# Patient Record
Sex: FEMALE | Race: BLACK OR AFRICAN AMERICAN | NOT HISPANIC OR LATINO | Employment: FULL TIME | URBAN - NONMETROPOLITAN AREA
[De-identification: names, ages, dates, MRNs, and addresses within clinical notes are randomized per-mention and may not be internally consistent; named-entity substitution may affect disease eponyms.]

---

## 2023-05-04 ENCOUNTER — OFFICE VISIT (OUTPATIENT)
Dept: FAMILY MEDICINE | Facility: CLINIC | Age: 33
End: 2023-05-04
Payer: COMMERCIAL

## 2023-05-04 VITALS
RESPIRATION RATE: 18 BRPM | WEIGHT: 293 LBS | OXYGEN SATURATION: 99 % | HEART RATE: 75 BPM | HEIGHT: 66 IN | BODY MASS INDEX: 47.09 KG/M2 | DIASTOLIC BLOOD PRESSURE: 104 MMHG | SYSTOLIC BLOOD PRESSURE: 169 MMHG | TEMPERATURE: 100 F

## 2023-05-04 DIAGNOSIS — I10 ESSENTIAL HYPERTENSION: Primary | ICD-10-CM

## 2023-05-04 DIAGNOSIS — R73.9 HYPERGLYCEMIA: ICD-10-CM

## 2023-05-04 DIAGNOSIS — R51.9 NONINTRACTABLE HEADACHE, UNSPECIFIED CHRONICITY PATTERN, UNSPECIFIED HEADACHE TYPE: ICD-10-CM

## 2023-05-04 DIAGNOSIS — Z86.2 HISTORY OF ITP: ICD-10-CM

## 2023-05-04 DIAGNOSIS — E66.01 CLASS 3 SEVERE OBESITY WITH SERIOUS COMORBIDITY AND BODY MASS INDEX (BMI) OF 50.0 TO 59.9 IN ADULT, UNSPECIFIED OBESITY TYPE: ICD-10-CM

## 2023-05-04 DIAGNOSIS — Z90.81 H/O SPLENECTOMY: ICD-10-CM

## 2023-05-04 PROBLEM — M32.9 LUPUS: Status: RESOLVED | Noted: 2023-05-04 | Resolved: 2023-05-04

## 2023-05-04 PROBLEM — M32.9 LUPUS: Status: ACTIVE | Noted: 2023-05-04

## 2023-05-04 LAB
ALBUMIN SERPL BCP-MCNC: 3.1 G/DL (ref 3.5–5)
ALBUMIN/GLOB SERPL: 0.6 {RATIO}
ALP SERPL-CCNC: 52 U/L (ref 37–98)
ALT SERPL W P-5'-P-CCNC: 17 U/L (ref 13–56)
ANION GAP SERPL CALCULATED.3IONS-SCNC: 4 MMOL/L (ref 7–16)
AST SERPL W P-5'-P-CCNC: 12 U/L (ref 15–37)
BASOPHILS # BLD AUTO: 0.02 K/UL (ref 0–0.2)
BASOPHILS NFR BLD AUTO: 0.2 % (ref 0–1)
BILIRUB SERPL-MCNC: 0.2 MG/DL (ref ?–1.2)
BUN SERPL-MCNC: 8 MG/DL (ref 7–18)
BUN/CREAT SERPL: 13 (ref 6–20)
CALCIUM SERPL-MCNC: 8.5 MG/DL (ref 8.5–10.1)
CHLORIDE SERPL-SCNC: 106 MMOL/L (ref 98–107)
CHOLEST SERPL-MCNC: 172 MG/DL (ref 0–200)
CHOLEST/HDLC SERPL: 4.1 {RATIO}
CO2 SERPL-SCNC: 28 MMOL/L (ref 21–32)
CREAT SERPL-MCNC: 0.61 MG/DL (ref 0.55–1.02)
DIFFERENTIAL METHOD BLD: ABNORMAL
EGFR (NO RACE VARIABLE) (RUSH/TITUS): 123 ML/MIN/1.73M2
EOSINOPHIL # BLD AUTO: 0.07 K/UL (ref 0–0.5)
EOSINOPHIL NFR BLD AUTO: 0.8 % (ref 1–4)
ERYTHROCYTE [DISTWIDTH] IN BLOOD BY AUTOMATED COUNT: 13.5 % (ref 11.5–14.5)
GLOBULIN SER-MCNC: 5 G/DL (ref 2–4)
GLUCOSE SERPL-MCNC: 136 MG/DL (ref 74–106)
HCT VFR BLD AUTO: 39.5 % (ref 38–47)
HDLC SERPL-MCNC: 42 MG/DL (ref 40–60)
HGB BLD-MCNC: 13 G/DL (ref 12–16)
IMM GRANULOCYTES # BLD AUTO: 0.02 K/UL (ref 0–0.04)
IMM GRANULOCYTES NFR BLD: 0.2 % (ref 0–0.4)
LDLC SERPL CALC-MCNC: 117 MG/DL
LDLC/HDLC SERPL: 2.8 {RATIO}
LYMPHOCYTES # BLD AUTO: 3.98 K/UL (ref 1–4.8)
LYMPHOCYTES NFR BLD AUTO: 44.4 % (ref 27–41)
MCH RBC QN AUTO: 29.9 PG (ref 27–31)
MCHC RBC AUTO-ENTMCNC: 32.9 G/DL (ref 32–36)
MCV RBC AUTO: 90.8 FL (ref 80–96)
MONOCYTES # BLD AUTO: 0.7 K/UL (ref 0–0.8)
MONOCYTES NFR BLD AUTO: 7.8 % (ref 2–6)
MPC BLD CALC-MCNC: 10.3 FL (ref 9.4–12.4)
NEUTROPHILS # BLD AUTO: 4.17 K/UL (ref 1.8–7.7)
NEUTROPHILS NFR BLD AUTO: 46.6 % (ref 53–65)
NONHDLC SERPL-MCNC: 130 MG/DL
NRBC # BLD AUTO: 0 X10E3/UL
NRBC, AUTO (.00): 0 %
PLATELET # BLD AUTO: 369 K/UL (ref 150–400)
POTASSIUM SERPL-SCNC: 4 MMOL/L (ref 3.5–5.1)
PROT SERPL-MCNC: 8.1 G/DL (ref 6.4–8.2)
RBC # BLD AUTO: 4.35 M/UL (ref 4.2–5.4)
SODIUM SERPL-SCNC: 134 MMOL/L (ref 136–145)
TRIGL SERPL-MCNC: 67 MG/DL (ref 35–150)
VLDLC SERPL-MCNC: 13 MG/DL
WBC # BLD AUTO: 8.96 K/UL (ref 4.5–11)

## 2023-05-04 PROCEDURE — 83036 HEMOGLOBIN GLYCOSYLATED A1C: CPT | Mod: ,,, | Performed by: CLINICAL MEDICAL LABORATORY

## 2023-05-04 PROCEDURE — 96372 PR INJECTION,THERAP/PROPH/DIAG2ST, IM OR SUBCUT: ICD-10-PCS | Mod: ICN,,, | Performed by: FAMILY MEDICINE

## 2023-05-04 PROCEDURE — 1159F PR MEDICATION LIST DOCUMENTED IN MEDICAL RECORD: ICD-10-PCS | Mod: ICN,,, | Performed by: FAMILY MEDICINE

## 2023-05-04 PROCEDURE — 80053 COMPREHENSIVE METABOLIC PANEL: ICD-10-PCS | Mod: ,,, | Performed by: CLINICAL MEDICAL LABORATORY

## 2023-05-04 PROCEDURE — 3080F DIAST BP >= 90 MM HG: CPT | Mod: ICN,,, | Performed by: FAMILY MEDICINE

## 2023-05-04 PROCEDURE — 80053 COMPREHEN METABOLIC PANEL: CPT | Mod: ,,, | Performed by: CLINICAL MEDICAL LABORATORY

## 2023-05-04 PROCEDURE — 3008F PR BODY MASS INDEX (BMI) DOCUMENTED: ICD-10-PCS | Mod: ICN,,, | Performed by: FAMILY MEDICINE

## 2023-05-04 PROCEDURE — 1159F MED LIST DOCD IN RCRD: CPT | Mod: ICN,,, | Performed by: FAMILY MEDICINE

## 2023-05-04 PROCEDURE — 3046F HEMOGLOBIN A1C LEVEL >9.0%: CPT | Mod: ICN,,, | Performed by: FAMILY MEDICINE

## 2023-05-04 PROCEDURE — 3046F PR MOST RECENT HEMOGLOBIN A1C LEVEL > 9.0%: ICD-10-PCS | Mod: ICN,,, | Performed by: FAMILY MEDICINE

## 2023-05-04 PROCEDURE — 4010F ACE/ARB THERAPY RXD/TAKEN: CPT | Mod: ICN,,, | Performed by: FAMILY MEDICINE

## 2023-05-04 PROCEDURE — 99204 PR OFFICE/OUTPT VISIT, NEW, LEVL IV, 45-59 MIN: ICD-10-PCS | Mod: 25,ICN,, | Performed by: FAMILY MEDICINE

## 2023-05-04 PROCEDURE — 3008F BODY MASS INDEX DOCD: CPT | Mod: ICN,,, | Performed by: FAMILY MEDICINE

## 2023-05-04 PROCEDURE — 3077F PR MOST RECENT SYSTOLIC BLOOD PRESSURE >= 140 MM HG: ICD-10-PCS | Mod: ICN,,, | Performed by: FAMILY MEDICINE

## 2023-05-04 PROCEDURE — 3080F PR MOST RECENT DIASTOLIC BLOOD PRESSURE >= 90 MM HG: ICD-10-PCS | Mod: ICN,,, | Performed by: FAMILY MEDICINE

## 2023-05-04 PROCEDURE — 80061 LIPID PANEL: ICD-10-PCS | Mod: ,,, | Performed by: CLINICAL MEDICAL LABORATORY

## 2023-05-04 PROCEDURE — 85025 CBC WITH DIFFERENTIAL: ICD-10-PCS | Mod: ,,, | Performed by: CLINICAL MEDICAL LABORATORY

## 2023-05-04 PROCEDURE — 4010F PR ACE/ARB THEARPY RXD/TAKEN: ICD-10-PCS | Mod: ICN,,, | Performed by: FAMILY MEDICINE

## 2023-05-04 PROCEDURE — 96372 THER/PROPH/DIAG INJ SC/IM: CPT | Mod: ICN,,, | Performed by: FAMILY MEDICINE

## 2023-05-04 PROCEDURE — 3077F SYST BP >= 140 MM HG: CPT | Mod: ICN,,, | Performed by: FAMILY MEDICINE

## 2023-05-04 PROCEDURE — 80061 LIPID PANEL: CPT | Mod: ,,, | Performed by: CLINICAL MEDICAL LABORATORY

## 2023-05-04 PROCEDURE — 85025 COMPLETE CBC W/AUTO DIFF WBC: CPT | Mod: ,,, | Performed by: CLINICAL MEDICAL LABORATORY

## 2023-05-04 PROCEDURE — 1160F RVW MEDS BY RX/DR IN RCRD: CPT | Mod: ICN,,, | Performed by: FAMILY MEDICINE

## 2023-05-04 PROCEDURE — 1160F PR REVIEW ALL MEDS BY PRESCRIBER/CLIN PHARMACIST DOCUMENTED: ICD-10-PCS | Mod: ICN,,, | Performed by: FAMILY MEDICINE

## 2023-05-04 PROCEDURE — 99204 OFFICE O/P NEW MOD 45 MIN: CPT | Mod: 25,ICN,, | Performed by: FAMILY MEDICINE

## 2023-05-04 PROCEDURE — 83036 HEMOGLOBIN A1C: ICD-10-PCS | Mod: ,,, | Performed by: CLINICAL MEDICAL LABORATORY

## 2023-05-04 RX ORDER — KETOROLAC TROMETHAMINE 30 MG/ML
30 INJECTION, SOLUTION INTRAMUSCULAR; INTRAVENOUS
Status: COMPLETED | OUTPATIENT
Start: 2023-05-04 | End: 2023-05-04

## 2023-05-04 RX ORDER — LISINOPRIL 20 MG/1
20 TABLET ORAL DAILY
Qty: 90 TABLET | Refills: 1 | Status: SHIPPED | OUTPATIENT
Start: 2023-05-04 | End: 2023-05-10 | Stop reason: DRUGHIGH

## 2023-05-04 RX ADMIN — KETOROLAC TROMETHAMINE 30 MG: 30 INJECTION, SOLUTION INTRAMUSCULAR; INTRAVENOUS at 02:05

## 2023-05-04 NOTE — PROGRESS NOTES
New Clinic Note    Norma Blue is a 33 y.o. female     CC:   Chief Complaint   Patient presents with    Hypertension     New patient to the clinic. Stated she was seen at the ER at [facility] for elevated blood pressure and headache  last night and sent home from her work place at [employer] due to blood pressure was 145/100 and got to /108 but went down 156/90 and was discharged and instructed to see her PCP. Will need a work excuse before [employer] will allow her to return back to work due to her blood pressure and headache. Her mother has h/o HTN and had (2) strokes in the past. H/O Spleenectomy and has Lupus.     Headache     Right frontal headache.    Chest Pain     Stated she is also having chest discomfort. She is a smoker and has a weight problem.     Nicotine Dependence     Smoker for 10 years. Lives her mother.         Subjective    History of Present Illness HPI   Patient is here to establish with a new physician. She complains of elevated blood pressure and headaches. She denies shortness of breath, chest pain or blurry vision. She has not taken anything for her symptoms.     Current Outpatient Medications:     ACCU-CHEK GUIDE GLUCOSE METER [facility], USE 1 ONCE DAILY DUE TO DIABETES, Disp: , Rfl:     ACCU-CHEK SOFTCLIX LANCETS [facility], USE 1 TO CHECK GLUCOSE ONCE DAILY, Disp: , Rfl:     blood sugar diagnostic Strp, 1 strip by Misc.(Non-Drug; Combo Route) route once daily., Disp: 100 strip, Rfl: 3    blood-glucose meter kit, Patient to test one time daily due to diabetes E 11.9, Disp: 1 each, Rfl: 0    lancets (TRUEPLUS LANCETS) 33 gauge Misc, 1 lancet by Misc.(Non-Drug; Combo Route) route once daily., Disp: 100 each, Rfl: 3    lisinopriL (PRINIVIL,ZESTRIL) 40 MG tablet, Take 1 tablet (40 mg total) by mouth once daily., Disp: 90 tablet, Rfl: 1    metFORMIN (GLUCOPHAGE-XR) 500 MG ER 24hr tablet, Take 1 tablet (500 mg total) by mouth daily with breakfast., Disp: 90 tablet, Rfl: 1    semaglutide (OZEMPIC) 0.25 mg or  "0.5 mg (2 mg/3 mL) pen injector, Inject 0.5 mg into the skin every 7 days., Disp: 1 each, Rfl: 2     History reviewed. No pertinent past medical history.     Family History   Problem Relation Age of Onset    Hypertension Mother     Diabetes Father     Hypertension Maternal Grandmother         Past Surgical History:   Procedure Laterality Date    spleenectomy          Review of Systems   Constitutional:  Negative for fatigue and fever.   HENT:  Negative for ear pain, postnasal drip, rhinorrhea and sinus pressure/congestion.    Respiratory:  Negative for cough and shortness of breath.    Cardiovascular:  Positive for chest pain.   Gastrointestinal:  Negative for abdominal pain, diarrhea, nausea and vomiting.   Genitourinary:  Negative for dysuria.   Neurological:  Positive for headaches.      BP (!) 169/104 (BP Location: Left arm, Patient Position: Sitting, BP Method: Large (Automatic))   Pulse 75   Temp 100.1 °F (37.8 °C) (Oral)   Resp 18   Ht 5' 6" (1.676 m)   Wt (!) 166 kg (366 lb)   LMP 04/20/2023   SpO2 99%   BMI 59.07 kg/m²      Physical Exam  HENT:      Head: Normocephalic and atraumatic.   Cardiovascular:      Rate and Rhythm: Normal rate and regular rhythm.   Pulmonary:      Effort: Pulmonary effort is normal.      Breath sounds: Normal breath sounds.   Neurological:      General: No focal deficit present.      Mental Status: She is alert and oriented to person, place, and time.   Psychiatric:         Mood and Affect: Mood normal.         Behavior: Behavior normal.        Assessment and Plan      ICD-10-CM ICD-9-CM   1. Essential hypertension  I10 401.9   2. H/O splenectomy  Z90.81 V45.79   3. History of ITP  Z86.2 V12.3   4. Class 3 severe obesity with serious comorbidity and body mass index (BMI) of 50.0 to 59.9 in adult, unspecified obesity type  E66.01 278.01    Z68.43 V85.43   5. Nonintractable headache, unspecified chronicity pattern, unspecified headache type  R51.9 784.0   6. Hyperglycemia  " R73.9 790.29        1. Essential hypertension  Not controlled. Increase lisinopril  -     Discontinue: lisinopriL (PRINIVIL,ZESTRIL) 20 MG tablet; Take 1 tablet (20 mg total) by mouth once daily.  Dispense: 90 tablet; Refill: 1  -     Comprehensive Metabolic Panel; Future; Expected date: 05/04/2023  -     CBC Auto Differential; Future; Expected date: 05/04/2023  -     Lipid Panel; Future; Expected date: 05/04/2023    2. H/O splenectomy  Stable    3. History of ITP  Stable     4. Class 3 severe obesity with serious comorbidity and body mass index (BMI) of 50.0 to 59.9 in adult, unspecified obesity type  Diet and educational handouts given.    5. Nonintractable headache, unspecified chronicity pattern, unspecified headache type  Should improve with blood pressure control.   -     ketorolac injection 30 mg    6. Hyperglycemia  -     Hemoglobin A1C        Follow up in about 1 week (around 5/11/2023).

## 2023-05-04 NOTE — LETTER
May 4, 2023    Norma Blue  [address]  [place] [address]         [facility] [address]   [place] [address]  Phone: [phone]  Fax: [phone] May 4, 2023     Patient: Norma Blue   YOB: 1990   Date of Visit: 5/4/2023       To Whom It May Concern:    It is my medical opinion that Norma Blue may return to work on 05/08/2023 .    If you have any questions or concerns, please don't hesitate to call.    Sincerely,    Jocelynn Carolina MD

## 2023-05-05 DIAGNOSIS — Z79.4 TYPE 2 DIABETES MELLITUS WITHOUT COMPLICATION, WITH LONG-TERM CURRENT USE OF INSULIN: Primary | ICD-10-CM

## 2023-05-05 DIAGNOSIS — E11.9 TYPE 2 DIABETES MELLITUS WITHOUT COMPLICATION, WITH LONG-TERM CURRENT USE OF INSULIN: Primary | ICD-10-CM

## 2023-05-05 DIAGNOSIS — R73.9 HYPERGLYCEMIA: Primary | ICD-10-CM

## 2023-05-05 LAB
EST. AVERAGE GLUCOSE BLD GHB EST-MCNC: 220 MG/DL
HBA1C MFR BLD HPLC: 9.2 % (ref 4.5–6.6)

## 2023-05-05 NOTE — PROGRESS NOTES
Patient is a diabetic. Needs to come in to discuss options. Go ahead and call in Metformin XR 500mg poqd. Will need to see Lena too.

## 2023-05-06 ENCOUNTER — PATIENT MESSAGE (OUTPATIENT)
Dept: FAMILY MEDICINE | Facility: CLINIC | Age: 33
End: 2023-05-06
Payer: COMMERCIAL

## 2023-05-08 ENCOUNTER — NUTRITION (OUTPATIENT)
Dept: FAMILY MEDICINE | Facility: CLINIC | Age: 33
End: 2023-05-08
Payer: COMMERCIAL

## 2023-05-08 DIAGNOSIS — E11.9 TYPE 2 DIABETES MELLITUS WITHOUT COMPLICATION, WITH LONG-TERM CURRENT USE OF INSULIN: Primary | ICD-10-CM

## 2023-05-08 DIAGNOSIS — Z79.4 TYPE 2 DIABETES MELLITUS WITHOUT COMPLICATION, WITH LONG-TERM CURRENT USE OF INSULIN: Primary | ICD-10-CM

## 2023-05-08 DIAGNOSIS — E66.01 CLASS 3 SEVERE OBESITY WITH SERIOUS COMORBIDITY AND BODY MASS INDEX (BMI) OF 50.0 TO 59.9 IN ADULT, UNSPECIFIED OBESITY TYPE: ICD-10-CM

## 2023-05-08 RX ORDER — METFORMIN HYDROCHLORIDE 500 MG/1
500 TABLET, EXTENDED RELEASE ORAL
Qty: 90 TABLET | Refills: 1 | Status: SHIPPED | OUTPATIENT
Start: 2023-05-08 | End: 2023-08-03 | Stop reason: SDUPTHER

## 2023-05-08 NOTE — PROGRESS NOTES
Pt new to our clinic, newly Dx DM with 9.2% Hgba1c, family Hx of DM states Pt. Pt comes in to discuss DM, which I explained how the body uses food for energy. I urged daily walking, use of water intake daily and the need to watch intake of CARB choices. Explanation of what a CARB was and how to portion at each meal, how to read food labels for Total CHO, 15g=1CARB choice.  We read many food labels and how to incorporate into sample meals, the use of portion control demonstrated with 1/2cup measure, cupped palm of hand, use of 1cup for cereal. Starches, Fruit and Yogurt for carb intake, Pt doesn't drink milk as beverage, only small amt in cereal.  We discussed the many non-starchy veggies that can be used each meal for satiety. Lean meats encouraged and less fat in meal prep/less fried items, use of FF salad dressings, less finn,more mustard, avoidance of sausage, hot dogs,harris due to high fat content. Meal planning for DM and Weight management, with daily walking regime, and daily use of water, SF flavors added, or any SF beverages. Avoidance of fruit juices and sugar containing beverages due to rapid rise of glucose explained.  I gave a BS log and checked BS at 11:54, 137mg, Pt states not eating anything this morning. Pt states to see Dr Carolina Thursday, I will try to see again then, a Rx for glucose monitor can be obtained.  Weight Loss encouraged with above goals for health. Pt has started the metformin XR 500mg 1xdaily. I explained how metformin works to assist BS control.

## 2023-05-10 ENCOUNTER — NUTRITION (OUTPATIENT)
Dept: FAMILY MEDICINE | Facility: CLINIC | Age: 33
End: 2023-05-10
Payer: COMMERCIAL

## 2023-05-10 ENCOUNTER — OFFICE VISIT (OUTPATIENT)
Dept: FAMILY MEDICINE | Facility: CLINIC | Age: 33
End: 2023-05-10
Payer: COMMERCIAL

## 2023-05-10 VITALS
HEART RATE: 85 BPM | SYSTOLIC BLOOD PRESSURE: 160 MMHG | RESPIRATION RATE: 18 BRPM | WEIGHT: 293 LBS | DIASTOLIC BLOOD PRESSURE: 96 MMHG | BODY MASS INDEX: 47.09 KG/M2 | TEMPERATURE: 98 F | HEIGHT: 66 IN | OXYGEN SATURATION: 98 %

## 2023-05-10 DIAGNOSIS — E11.9 TYPE 2 DIABETES MELLITUS WITHOUT COMPLICATION, WITH LONG-TERM CURRENT USE OF INSULIN: Primary | ICD-10-CM

## 2023-05-10 DIAGNOSIS — I10 ESSENTIAL HYPERTENSION: Primary | ICD-10-CM

## 2023-05-10 DIAGNOSIS — E66.01 CLASS 3 SEVERE OBESITY WITH SERIOUS COMORBIDITY AND BODY MASS INDEX (BMI) OF 50.0 TO 59.9 IN ADULT, UNSPECIFIED OBESITY TYPE: ICD-10-CM

## 2023-05-10 DIAGNOSIS — E11.65 TYPE 2 DIABETES MELLITUS WITH HYPERGLYCEMIA, WITHOUT LONG-TERM CURRENT USE OF INSULIN: ICD-10-CM

## 2023-05-10 DIAGNOSIS — Z79.4 TYPE 2 DIABETES MELLITUS WITHOUT COMPLICATION, WITH LONG-TERM CURRENT USE OF INSULIN: Primary | ICD-10-CM

## 2023-05-10 PROCEDURE — 99214 PR OFFICE/OUTPT VISIT, EST, LEVL IV, 30-39 MIN: ICD-10-PCS | Mod: ,,, | Performed by: FAMILY MEDICINE

## 2023-05-10 PROCEDURE — 3046F HEMOGLOBIN A1C LEVEL >9.0%: CPT | Mod: ,,, | Performed by: FAMILY MEDICINE

## 2023-05-10 PROCEDURE — 3008F PR BODY MASS INDEX (BMI) DOCUMENTED: ICD-10-PCS | Mod: ,,, | Performed by: FAMILY MEDICINE

## 2023-05-10 PROCEDURE — 3077F SYST BP >= 140 MM HG: CPT | Mod: ,,, | Performed by: FAMILY MEDICINE

## 2023-05-10 PROCEDURE — 4010F PR ACE/ARB THEARPY RXD/TAKEN: ICD-10-PCS | Mod: ,,, | Performed by: FAMILY MEDICINE

## 2023-05-10 PROCEDURE — 3008F BODY MASS INDEX DOCD: CPT | Mod: ,,, | Performed by: FAMILY MEDICINE

## 2023-05-10 PROCEDURE — 4010F ACE/ARB THERAPY RXD/TAKEN: CPT | Mod: ,,, | Performed by: FAMILY MEDICINE

## 2023-05-10 PROCEDURE — 3080F DIAST BP >= 90 MM HG: CPT | Mod: ,,, | Performed by: FAMILY MEDICINE

## 2023-05-10 PROCEDURE — 99214 OFFICE O/P EST MOD 30 MIN: CPT | Mod: ,,, | Performed by: FAMILY MEDICINE

## 2023-05-10 PROCEDURE — 1160F RVW MEDS BY RX/DR IN RCRD: CPT | Mod: ,,, | Performed by: FAMILY MEDICINE

## 2023-05-10 PROCEDURE — 3080F PR MOST RECENT DIASTOLIC BLOOD PRESSURE >= 90 MM HG: ICD-10-PCS | Mod: ,,, | Performed by: FAMILY MEDICINE

## 2023-05-10 PROCEDURE — 3077F PR MOST RECENT SYSTOLIC BLOOD PRESSURE >= 140 MM HG: ICD-10-PCS | Mod: ,,, | Performed by: FAMILY MEDICINE

## 2023-05-10 PROCEDURE — 3046F PR MOST RECENT HEMOGLOBIN A1C LEVEL > 9.0%: ICD-10-PCS | Mod: ,,, | Performed by: FAMILY MEDICINE

## 2023-05-10 PROCEDURE — 1159F PR MEDICATION LIST DOCUMENTED IN MEDICAL RECORD: ICD-10-PCS | Mod: ,,, | Performed by: FAMILY MEDICINE

## 2023-05-10 PROCEDURE — 1159F MED LIST DOCD IN RCRD: CPT | Mod: ,,, | Performed by: FAMILY MEDICINE

## 2023-05-10 PROCEDURE — 1160F PR REVIEW ALL MEDS BY PRESCRIBER/CLIN PHARMACIST DOCUMENTED: ICD-10-PCS | Mod: ,,, | Performed by: FAMILY MEDICINE

## 2023-05-10 RX ORDER — LISINOPRIL 40 MG/1
40 TABLET ORAL DAILY
Qty: 90 TABLET | Refills: 1 | Status: SHIPPED | OUTPATIENT
Start: 2023-05-10 | End: 2023-08-03 | Stop reason: SDUPTHER

## 2023-05-10 NOTE — PROGRESS NOTES
"New Clinic Note    Norma Blue is a 33 y.o. female     CC:   Chief Complaint   Patient presents with    Hypertension    Follow-up     Patient stated she is here for a one week follow up on elevated blood pressure. Has a "wrist" blood pressure cuff at home but stated she could borrow one from her mother.  Given a Blood Pressure Log to take home and record her daily pressures. Works at [employer].         Subjective    History of Present Illness HPI   Patient is here to follow up on her blood pressure and newly diagnosed diabetes. She is tolerating her lisinopril and metformin. Her headaches have resolved since starting on lisinopril. She has met with Lena Roberto for diabetic education.     Current Outpatient Medications:     metFORMIN (GLUCOPHAGE-XR) 500 MG ER 24hr tablet, Take 1 tablet (500 mg total) by mouth daily with breakfast., Disp: 90 tablet, Rfl: 1    blood sugar diagnostic Strp, 1 strip by Misc.(Non-Drug; Combo Route) route once daily., Disp: 100 strip, Rfl: 3    blood-glucose meter kit, Patient to test one time daily due to diabetes E 11.9, Disp: 1 each, Rfl: 0    lancets (TRUEPLUS LANCETS) 33 gauge Misc, 1 lancet by Misc.(Non-Drug; Combo Route) route once daily., Disp: 100 each, Rfl: 3    lisinopriL (PRINIVIL,ZESTRIL) 40 MG tablet, Take 1 tablet (40 mg total) by mouth once daily., Disp: 90 tablet, Rfl: 1     History reviewed. No pertinent past medical history.     Family History   Problem Relation Age of Onset    Hypertension Mother     Diabetes Father     Hypertension Maternal Grandmother         Past Surgical History:   Procedure Laterality Date    spleenectomy          Review of Systems   Constitutional:  Negative for fatigue and fever.   HENT:  Negative for ear pain, postnasal drip, rhinorrhea and sinus pressure/congestion.    Respiratory:  Negative for cough and shortness of breath.    Cardiovascular:  Negative for chest pain.   Gastrointestinal:  Negative for abdominal pain, diarrhea, nausea and " "vomiting.   Genitourinary:  Negative for dysuria.   Neurological:  Negative for headaches.      BP (!) 160/96 (BP Location: Right arm, Patient Position: Sitting, BP Method: Large (Automatic))   Pulse 85   Temp 98 °F (36.7 °C) (Oral)   Resp 18   Ht 5' 6" (1.676 m)   Wt (!) 164.2 kg (362 lb)   LMP 04/20/2023   SpO2 98%   BMI 58.43 kg/m²      Physical Exam  HENT:      Head: Normocephalic and atraumatic.   Cardiovascular:      Rate and Rhythm: Normal rate and regular rhythm.   Pulmonary:      Effort: Pulmonary effort is normal.      Breath sounds: Normal breath sounds.   Neurological:      Mental Status: She is alert and oriented to person, place, and time.   Psychiatric:         Mood and Affect: Mood normal.         Behavior: Behavior normal.        Assessment and Plan      ICD-10-CM ICD-9-CM   1. Essential hypertension  I10 401.9   2. Type 2 diabetes mellitus with hyperglycemia, without long-term current use of insulin  E11.65 250.00     790.29        1. Essential hypertension  Not controlled. Increase lisinopril.  -     lisinopriL (PRINIVIL,ZESTRIL) 40 MG tablet; Take 1 tablet (40 mg total) by mouth once daily.  Dispense: 90 tablet; Refill: 1    2. Type 2 diabetes mellitus with hyperglycemia, without long-term current use of insulin  Continue metformin. Patient given sample of Ozempic. If she tolerates will write a prescription.   -     blood-glucose meter kit; Patient to test one time daily due to diabetes E 11.9  Dispense: 1 each; Refill: 0  -     blood sugar diagnostic Strp; 1 strip by Misc.(Non-Drug; Combo Route) route once daily.  Dispense: 100 strip; Refill: 3  -     lancets (TRUEPLUS LANCETS) 33 gauge Misc; 1 lancet by Misc.(Non-Drug; Combo Route) route once daily.  Dispense: 100 each; Refill: 3        Follow up in about 4 weeks (around 6/7/2023).       "

## 2023-05-10 NOTE — PROGRESS NOTES
Pt to start weekly injections of Ozempic, starting today Wednesday, I explained how to use sample pen as given today. Pt to dial to 0.25 dose each Wednesday for 3more Wednesdays,  was able to give own injection today. For the fifth week, will dial to 0.5 dose.   Demonstration of use of abdomen with rotating sites each week.   Pt states understanding each Wed, and the dates were written on the box to assist with memory of using q Wed.  I offered another time to talk, Monday-Thursday.

## 2023-05-12 PROBLEM — E11.65 TYPE 2 DIABETES MELLITUS WITH HYPERGLYCEMIA, WITHOUT LONG-TERM CURRENT USE OF INSULIN: Status: ACTIVE | Noted: 2023-05-12

## 2023-05-12 RX ORDER — INSULIN PUMP SYRINGE, 3 ML
EACH MISCELLANEOUS
Qty: 1 EACH | Refills: 0 | Status: SHIPPED | OUTPATIENT
Start: 2023-05-12 | End: 2024-05-09

## 2023-05-12 RX ORDER — LANCETS 33 GAUGE
1 EACH MISCELLANEOUS DAILY
Qty: 100 EACH | Refills: 3 | Status: SHIPPED | OUTPATIENT
Start: 2023-05-12 | End: 2023-08-03 | Stop reason: SDUPTHER

## 2023-05-17 ENCOUNTER — OFFICE VISIT (OUTPATIENT)
Dept: FAMILY MEDICINE | Facility: CLINIC | Age: 33
End: 2023-05-17
Payer: COMMERCIAL

## 2023-05-17 VITALS
OXYGEN SATURATION: 97 % | TEMPERATURE: 98 F | HEIGHT: 66 IN | HEART RATE: 88 BPM | SYSTOLIC BLOOD PRESSURE: 135 MMHG | BODY MASS INDEX: 47.09 KG/M2 | DIASTOLIC BLOOD PRESSURE: 86 MMHG | WEIGHT: 293 LBS | RESPIRATION RATE: 18 BRPM

## 2023-05-17 DIAGNOSIS — E11.65 TYPE 2 DIABETES MELLITUS WITH HYPERGLYCEMIA, WITHOUT LONG-TERM CURRENT USE OF INSULIN: Primary | ICD-10-CM

## 2023-05-17 DIAGNOSIS — E66.01 CLASS 3 SEVERE OBESITY WITH SERIOUS COMORBIDITY AND BODY MASS INDEX (BMI) OF 50.0 TO 59.9 IN ADULT, UNSPECIFIED OBESITY TYPE: ICD-10-CM

## 2023-05-17 DIAGNOSIS — I10 ESSENTIAL HYPERTENSION: ICD-10-CM

## 2023-05-17 PROCEDURE — 1159F MED LIST DOCD IN RCRD: CPT | Mod: ,,, | Performed by: NURSE PRACTITIONER

## 2023-05-17 PROCEDURE — 3046F PR MOST RECENT HEMOGLOBIN A1C LEVEL > 9.0%: ICD-10-PCS | Mod: ,,, | Performed by: NURSE PRACTITIONER

## 2023-05-17 PROCEDURE — 3008F PR BODY MASS INDEX (BMI) DOCUMENTED: ICD-10-PCS | Mod: ,,, | Performed by: NURSE PRACTITIONER

## 2023-05-17 PROCEDURE — 99214 OFFICE O/P EST MOD 30 MIN: CPT | Mod: ,,, | Performed by: NURSE PRACTITIONER

## 2023-05-17 PROCEDURE — 3079F PR MOST RECENT DIASTOLIC BLOOD PRESSURE 80-89 MM HG: ICD-10-PCS | Mod: ,,, | Performed by: NURSE PRACTITIONER

## 2023-05-17 PROCEDURE — 99214 PR OFFICE/OUTPT VISIT, EST, LEVL IV, 30-39 MIN: ICD-10-PCS | Mod: ,,, | Performed by: NURSE PRACTITIONER

## 2023-05-17 PROCEDURE — 1159F PR MEDICATION LIST DOCUMENTED IN MEDICAL RECORD: ICD-10-PCS | Mod: ,,, | Performed by: NURSE PRACTITIONER

## 2023-05-17 PROCEDURE — 3075F SYST BP GE 130 - 139MM HG: CPT | Mod: ,,, | Performed by: NURSE PRACTITIONER

## 2023-05-17 PROCEDURE — 3008F BODY MASS INDEX DOCD: CPT | Mod: ,,, | Performed by: NURSE PRACTITIONER

## 2023-05-17 PROCEDURE — 3079F DIAST BP 80-89 MM HG: CPT | Mod: ,,, | Performed by: NURSE PRACTITIONER

## 2023-05-17 PROCEDURE — 3046F HEMOGLOBIN A1C LEVEL >9.0%: CPT | Mod: ,,, | Performed by: NURSE PRACTITIONER

## 2023-05-17 PROCEDURE — 4010F PR ACE/ARB THEARPY RXD/TAKEN: ICD-10-PCS | Mod: ,,, | Performed by: NURSE PRACTITIONER

## 2023-05-17 PROCEDURE — 1160F RVW MEDS BY RX/DR IN RCRD: CPT | Mod: ,,, | Performed by: NURSE PRACTITIONER

## 2023-05-17 PROCEDURE — 3075F PR MOST RECENT SYSTOLIC BLOOD PRESS GE 130-139MM HG: ICD-10-PCS | Mod: ,,, | Performed by: NURSE PRACTITIONER

## 2023-05-17 PROCEDURE — 4010F ACE/ARB THERAPY RXD/TAKEN: CPT | Mod: ,,, | Performed by: NURSE PRACTITIONER

## 2023-05-17 PROCEDURE — 1160F PR REVIEW ALL MEDS BY PRESCRIBER/CLIN PHARMACIST DOCUMENTED: ICD-10-PCS | Mod: ,,, | Performed by: NURSE PRACTITIONER

## 2023-05-28 NOTE — PROGRESS NOTES
Cristina Brooks DNP, KRISTOPHER    [address] [place] [address]     PATIENT NAME: Norma Blue  : 1990  DATE: 23  MRN: [id number]      Billing Provider: Cristina Brooks DNP, FNP  Level of Service:   Patient PCP Information       Provider PCP Type    Jocelynn Carolina MD General            Reason for Visit / Chief Complaint: Diabetes (Patient is here for a follow up for HTN and DM. Newly diagnosed diabetic who recently started on Ozempic. Stated her blood sugar this morning was 114. Did not bring her blood sugar or blood pressure log in for review. She has had weight loss since her last visit. Never had a diabetic foot exam. Never had a diabetic eye exam. ), Hypertension, and Follow-up       Update PCP  Update Chief Complaint         History of Present Illness / Problem Focused Workflow     Norma Blue presents to the clinic with Diabetes (Patient is here for a follow up for HTN and DM. Newly diagnosed diabetic who recently started on Ozempic. Stated her blood sugar this morning was 114. Did not bring her blood sugar or blood pressure log in for review. She has had weight loss since her last visit. Never had a diabetic foot exam. Never had a diabetic eye exam. ), Hypertension, and Follow-up     Pt has had 7 wt loss since starting Ozempic for diabetes.     Diabetes  Pertinent negatives for diabetes include no chest pain and no fatigue.   Hypertension  Pertinent negatives include no chest pain or shortness of breath.   Follow-up  Pertinent negatives include no abdominal pain, chest pain, fatigue, fever, nausea or vomiting.     Review of Systems     Review of Systems   Constitutional:  Negative for appetite change, fatigue, fever and unexpected weight change.   HENT:  Negative for hearing loss.    Eyes:  Negative for visual disturbance.   Respiratory:  Negative for shortness of breath.    Cardiovascular:  Negative for chest pain.   Gastrointestinal:  Negative  for abdominal pain, constipation, diarrhea, nausea and vomiting.   Genitourinary:  Negative for dysuria.   Musculoskeletal:  Negative for back pain.   Psychiatric/Behavioral:  Negative for sleep disturbance.       Medical / Social / Family History   History reviewed. No pertinent past medical history.    Past Surgical History:   Procedure Laterality Date    spleenectomy         Social History  Ms. Norma Blue  reports that she has been smoking cigarettes. She started smoking about 20 years ago. She has a 10.00 pack-year smoking history. She has been exposed to tobacco smoke. She has never used smokeless tobacco. She reports that she does not currently use drugs. She reports that she does not drink alcohol.    Family History  Ms. Norma Blue's family history includes Diabetes in her father; Hypertension in her maternal grandmother and mother.    Medications and Allergies     Medications  Outpatient Medications Marked as Taking for the 5/17/23 encounter (Office Visit) with Cristina Brooks, BRISEIDA, FNP   Medication Sig Dispense Refill    ACCU-CHEK SOFTCLIX LANCETS [facility] USE 1 TO CHECK GLUCOSE ONCE DAILY      blood sugar diagnostic Strp 1 strip by Misc.(Non-Drug; Combo Route) route once daily. 100 strip 3    blood-glucose meter kit Patient to test one time daily due to diabetes E 11.9 1 each 0    lancets (TRUEPLUS LANCETS) 33 gauge Misc 1 lancet by Misc.(Non-Drug; Combo Route) route once daily. 100 each 3    lisinopriL (PRINIVIL,ZESTRIL) 40 MG tablet Take 1 tablet (40 mg total) by mouth once daily. 90 tablet 1    metFORMIN (GLUCOPHAGE-XR) 500 MG ER 24hr tablet Take 1 tablet (500 mg total) by mouth daily with breakfast. 90 tablet 1       Allergies  Review of patient's allergies indicates:   Allergen Reactions    Sulfamethoxazole-trimethoprim Rash       Physical Examination     Vitals:    05/17/23 0851 05/17/23 0855   BP: (!) 139/97 135/86   BP Location: Left arm Left arm   Patient Position: Sitting Sitting   BP  "Method: Large (Automatic) Large (Automatic)   Pulse: 88    Resp: 18    Temp: 98.3 °F (36.8 °C)    TempSrc: Oral    SpO2: 97%    Weight: (!) 158.3 kg (349 lb)    Height: 5' 6" (1.676 m)      Physical Exam  Vitals and nursing note reviewed.   Constitutional:       General: She is not in acute distress.     Appearance: She is obese.   HENT:      Nose: Nose normal.      Mouth/Throat:      Mouth: Mucous membranes are moist.   Eyes:      Pupils: Pupils are equal, round, and reactive to light.   Cardiovascular:      Rate and Rhythm: Normal rate and regular rhythm.      Pulses: Normal pulses.      Heart sounds: Normal heart sounds. No murmur heard.  Pulmonary:      Effort: Pulmonary effort is normal. No respiratory distress.      Breath sounds: Normal breath sounds. No wheezing, rhonchi or rales.   Chest:      Chest wall: No tenderness.   Abdominal:      General: Bowel sounds are normal.      Palpations: Abdomen is soft.   Musculoskeletal:         General: Normal range of motion.      Cervical back: Normal range of motion and neck supple.      Right lower leg: No edema.      Left lower leg: No edema.   Skin:     General: Skin is warm and dry.   Neurological:      General: No focal deficit present.      Mental Status: She is alert and oriented to person, place, and time.        Assessment and Plan (including Health Maintenance)      Problem List  Smart Sets  Document Outside HM   :    Plan:         Health Maintenance Due   Topic Date Due    Hepatitis C Screening  Never done    Cervical Cancer Screening  Never done    COVID-19 Vaccine (1) Never done    Diabetes Urine Screening  Never done    Pneumococcal Vaccines (Age 0-64) (1 - PCV) Never done    Foot Exam  Never done    Eye Exam  Never done    HIV Screening  Never done    TETANUS VACCINE  Never done       Problem List Items Addressed This Visit          Endocrine    Type 2 diabetes mellitus with hyperglycemia, without long-term current use of insulin - Primary     Other " Visit Diagnoses       Class 3 severe obesity with serious comorbidity and body mass index (BMI) of 50.0 to 59.9 in adult, unspecified obesity type        Essential hypertension              Type 2 diabetes mellitus with hyperglycemia, without long-term current use of insulin    Class 3 severe obesity with serious comorbidity and body mass index (BMI) of 50.0 to 59.9 in adult, unspecified obesity type    Essential hypertension       Health Maintenance Topics with due status: Not Due       Topic Last Completion Date    Lipid Panel 05/04/2023    Hemoglobin A1c 05/04/2023           Future Appointments   Date Time Provider Department Center   8/23/2023  8:45 AM Jocelynn Carolina MD [facility]        No follow-ups on file.     Signature:  Cristina Brooks DNP, FNP  [address] [place] [address]  Phone #: [phone]  Fax #: [phone]    Date of encounter: 5/17/23    Patient Instructions   Continue current meds and continue to monitor BP, BS, and weight. Meet with DESI Roberto RD, diabetic educator. Keep follow up appts with PCP.       Dr. Poe reviewing records for Mildred Brooks NP.   I have reviewed the encounter and agree with the assessment and plan.   Turner Poe MD

## 2023-05-28 NOTE — PATIENT INSTRUCTIONS
Continue current meds and continue to monitor BP, BS, and weight. Meet with DESI Roberto RD, diabetic educator. Keep follow up appts with PCP.

## 2023-06-05 RX ORDER — SEMAGLUTIDE 0.68 MG/ML
0.5 INJECTION, SOLUTION SUBCUTANEOUS
Qty: 1 EACH | Refills: 2 | Status: SHIPPED | OUTPATIENT
Start: 2023-06-05 | End: 2023-08-03 | Stop reason: SDUPTHER

## 2023-06-23 LAB
LEFT EYE DM RETINOPATHY: NEGATIVE
RIGHT EYE DM RETINOPATHY: NEGATIVE

## 2023-07-18 ENCOUNTER — PATIENT OUTREACH (OUTPATIENT)
Dept: ADMINISTRATIVE | Facility: HOSPITAL | Age: 33
End: 2023-07-18

## 2023-07-18 NOTE — LETTER
AUTHORIZATION FOR RELEASE OF   CONFIDENTIAL INFORMATION    Dear Dr. [place],    We are seeing Norma Blue, date of birth 1990, in the clinic at [facility]. Jocelynn Carolina MD is the patient's PCP. Norma Blue has an outstanding lab/procedure at the time we reviewed her chart. In order to help keep her health information updated, she has authorized us to request the following medical record(s):        (  )  MAMMOGRAM                                      (  )  COLONOSCOPY      (  )  PAP SMEAR                                          (  )  OUTSIDE LAB RESULTS     (  )  DEXA SCAN                                          ( x )  EYE EXAM           (  )  FOOT EXAM                                          (  )  ENTIRE RECORD     (  )  OUTSIDE IMMUNIZATIONS                 (  )  _______________         Please fax records to [facility], Krista L Boyette, MD, [phone].     If you have any questions, please contact Doreen Altamirano at .           Patient Name: Norma Blue  : 1990  Patient Phone #: [phone]

## 2023-07-18 NOTE — PROGRESS NOTES
Patient is due for a pap smear. Pt request this be done with Dr. Carolina. Appointment scheduled for 8/3/23 at 0800.

## 2023-07-19 ENCOUNTER — PATIENT MESSAGE (OUTPATIENT)
Dept: ADMINISTRATIVE | Facility: HOSPITAL | Age: 33
End: 2023-07-19

## 2023-07-20 ENCOUNTER — PATIENT OUTREACH (OUTPATIENT)
Dept: ADMINISTRATIVE | Facility: HOSPITAL | Age: 33
End: 2023-07-20

## 2023-08-03 ENCOUNTER — OFFICE VISIT (OUTPATIENT)
Dept: FAMILY MEDICINE | Facility: CLINIC | Age: 33
End: 2023-08-03
Payer: COMMERCIAL

## 2023-08-03 VITALS
RESPIRATION RATE: 18 BRPM | HEIGHT: 66 IN | SYSTOLIC BLOOD PRESSURE: 150 MMHG | DIASTOLIC BLOOD PRESSURE: 99 MMHG | WEIGHT: 293 LBS | HEART RATE: 76 BPM | TEMPERATURE: 98 F | OXYGEN SATURATION: 98 % | BODY MASS INDEX: 47.09 KG/M2

## 2023-08-03 DIAGNOSIS — Z13.220 ENCOUNTER FOR SCREENING FOR LIPID DISORDER: ICD-10-CM

## 2023-08-03 DIAGNOSIS — R73.9 HYPERGLYCEMIA: ICD-10-CM

## 2023-08-03 DIAGNOSIS — Z00.01 ANNUAL VISIT FOR GENERAL ADULT MEDICAL EXAMINATION WITH ABNORMAL FINDINGS: Primary | ICD-10-CM

## 2023-08-03 DIAGNOSIS — I10 ESSENTIAL HYPERTENSION: ICD-10-CM

## 2023-08-03 DIAGNOSIS — Z13.1 ENCOUNTER FOR SCREENING FOR DIABETES MELLITUS: ICD-10-CM

## 2023-08-03 DIAGNOSIS — E11.65 TYPE 2 DIABETES MELLITUS WITH HYPERGLYCEMIA, WITHOUT LONG-TERM CURRENT USE OF INSULIN: ICD-10-CM

## 2023-08-03 DIAGNOSIS — Z12.4 SCREENING FOR MALIGNANT NEOPLASM OF CERVIX: ICD-10-CM

## 2023-08-03 LAB
CHOLEST SERPL-MCNC: 129 MG/DL (ref 0–200)
CHOLEST/HDLC SERPL: 4.3 {RATIO}
GLUCOSE SERPL-MCNC: 118 MG/DL (ref 74–106)
HDLC SERPL-MCNC: 30 MG/DL (ref 40–60)
LDLC SERPL CALC-MCNC: 75 MG/DL
LDLC/HDLC SERPL: 2.5 {RATIO}
NONHDLC SERPL-MCNC: 99 MG/DL
TRIGL SERPL-MCNC: 118 MG/DL (ref 35–150)
VLDLC SERPL-MCNC: 24 MG/DL

## 2023-08-03 PROCEDURE — 3080F DIAST BP >= 90 MM HG: CPT | Mod: ,,, | Performed by: FAMILY MEDICINE

## 2023-08-03 PROCEDURE — 80061 LIPID PANEL: CPT | Mod: ,,, | Performed by: CLINICAL MEDICAL LABORATORY

## 2023-08-03 PROCEDURE — 1159F MED LIST DOCD IN RCRD: CPT | Mod: ,,, | Performed by: FAMILY MEDICINE

## 2023-08-03 PROCEDURE — 1160F PR REVIEW ALL MEDS BY PRESCRIBER/CLIN PHARMACIST DOCUMENTED: ICD-10-PCS | Mod: ,,, | Performed by: FAMILY MEDICINE

## 2023-08-03 PROCEDURE — 3044F HG A1C LEVEL LT 7.0%: CPT | Mod: ,,, | Performed by: FAMILY MEDICINE

## 2023-08-03 PROCEDURE — 1160F RVW MEDS BY RX/DR IN RCRD: CPT | Mod: ,,, | Performed by: FAMILY MEDICINE

## 2023-08-03 PROCEDURE — 3044F PR MOST RECENT HEMOGLOBIN A1C LEVEL <7.0%: ICD-10-PCS | Mod: ,,, | Performed by: FAMILY MEDICINE

## 2023-08-03 PROCEDURE — 88141 THINPREP PAP TEST: ICD-10-PCS | Mod: ,,, | Performed by: PATHOLOGY

## 2023-08-03 PROCEDURE — 4010F PR ACE/ARB THEARPY RXD/TAKEN: ICD-10-PCS | Mod: ,,, | Performed by: FAMILY MEDICINE

## 2023-08-03 PROCEDURE — 3008F BODY MASS INDEX DOCD: CPT | Mod: ,,, | Performed by: FAMILY MEDICINE

## 2023-08-03 PROCEDURE — 4010F ACE/ARB THERAPY RXD/TAKEN: CPT | Mod: ,,, | Performed by: FAMILY MEDICINE

## 2023-08-03 PROCEDURE — 1159F PR MEDICATION LIST DOCUMENTED IN MEDICAL RECORD: ICD-10-PCS | Mod: ,,, | Performed by: FAMILY MEDICINE

## 2023-08-03 PROCEDURE — 2023F DILAT RTA XM W/O RTNOPTHY: CPT | Mod: ,,, | Performed by: FAMILY MEDICINE

## 2023-08-03 PROCEDURE — 99395 PREV VISIT EST AGE 18-39: CPT | Mod: ,,, | Performed by: FAMILY MEDICINE

## 2023-08-03 PROCEDURE — 82947 GLUCOSE, FASTING: ICD-10-PCS | Mod: ,,, | Performed by: CLINICAL MEDICAL LABORATORY

## 2023-08-03 PROCEDURE — 88142 CYTOPATH C/V THIN LAYER: CPT | Mod: TC,GCY | Performed by: FAMILY MEDICINE

## 2023-08-03 PROCEDURE — 3080F PR MOST RECENT DIASTOLIC BLOOD PRESSURE >= 90 MM HG: ICD-10-PCS | Mod: ,,, | Performed by: FAMILY MEDICINE

## 2023-08-03 PROCEDURE — 82947 ASSAY GLUCOSE BLOOD QUANT: CPT | Mod: ,,, | Performed by: CLINICAL MEDICAL LABORATORY

## 2023-08-03 PROCEDURE — 99395 PR PREVENTIVE VISIT,EST,18-39: ICD-10-PCS | Mod: ,,, | Performed by: FAMILY MEDICINE

## 2023-08-03 PROCEDURE — 3008F PR BODY MASS INDEX (BMI) DOCUMENTED: ICD-10-PCS | Mod: ,,, | Performed by: FAMILY MEDICINE

## 2023-08-03 PROCEDURE — 80061 LIPID PANEL: ICD-10-PCS | Mod: ,,, | Performed by: CLINICAL MEDICAL LABORATORY

## 2023-08-03 PROCEDURE — 3077F PR MOST RECENT SYSTOLIC BLOOD PRESSURE >= 140 MM HG: ICD-10-PCS | Mod: ,,, | Performed by: FAMILY MEDICINE

## 2023-08-03 PROCEDURE — 3077F SYST BP >= 140 MM HG: CPT | Mod: ,,, | Performed by: FAMILY MEDICINE

## 2023-08-03 PROCEDURE — 2023F PR DILATED RETINAL EXAM W/O EVID OF RETINOPATHY: ICD-10-PCS | Mod: ,,, | Performed by: FAMILY MEDICINE

## 2023-08-03 PROCEDURE — 88141 CYTOPATH C/V INTERPRET: CPT | Mod: ,,, | Performed by: PATHOLOGY

## 2023-08-03 NOTE — PATIENT INSTRUCTIONS
"Patient Education       Type 2 Diabetes   The Basics   Written by the doctors and editors at [facility]   What is type 2 diabetes? -- Type 2 diabetes is a disorder that disrupts the way your body uses sugar. It is sometimes called type 2 diabetes mellitus.  All the cells in your body need sugar to work normally. Sugar gets into the cells with the help of a hormone called insulin. Insulin is made by the pancreas, an organ in the belly. If there is not enough insulin, or if your body stops responding to insulin, sugar builds up in the blood. That is what happens to people with diabetes.  There are two different types of diabetes:   Type 1 diabetes - In type 1 diabetes, the pancreas does not make insulin or makes very little insulin.  Type 2 diabetes - In most people with type 2 diabetes, the body stops responding to insulin normally. Then, over time, the pancreas stops making enough insulin.   Being overweight or obese increases a person's risk of developing type 2 diabetes. But people who are not overweight can get diabetes, too.  What are the symptoms of type 2 diabetes? -- Type 2 diabetes usually causes no symptoms. When symptoms do occur, they include:  Needing to urinate often  Intense thirst  Blurry vision  Can diabetes lead to other health problems? -- Yes. Type 2 diabetes might not make you feel sick. But if it is not managed, it can lead to serious problems over time, such as:  Heart attacks  Strokes  Kidney disease  Vision problems (or even blindness)  Pain or loss of feeling in the hands and feet  Needing to have fingers, toes, or other body parts removed (amputated)  How do I know if I have type 2 diabetes? -- To find out if you have type 2 diabetes, your doctor or nurse can do a blood test. There are 2 tests that can be used for this. Both involve measuring the amount of sugar in your blood, called your "blood sugar" or "blood glucose":   One of the tests measures your blood sugar at the time the blood " "sample is taken. This test is done in the morning. You can't eat or drink anything except water for at least 8 hours before the test.   The other test shows what your average blood sugar has been for the past 2 to 3 months. This blood test is called "hemoglobin A1C" or just "A1C." It can be checked at any time of the day, even if you have recently eaten.  How is type 2 diabetes treated? -- The goals of treatment are to control your blood sugar and lower the risk of future problems that can happen in people with diabetes. An important part of managing diabetes is to eat healthy foods and get plenty of physical activity.  There are a few medicines that help control blood sugar. Some people need to take pills that help the body make more insulin or that help insulin do its job. Others need insulin shots.  Depending on what medicines you take, you might need to check your blood sugar regularly at home. But not everyone with type 2 diabetes needs to do this. Your doctor or nurse will tell you if you should be checking your blood sugar, and when and how to do this.  Sometimes, people with type 2 diabetes also need medicines to reduce the problems caused by the disease. For instance, medicines used to lower blood pressure can reduce the chances of a heart attack or stroke.  It's also important to get certain vaccines, such as vaccines to protect against the flu and coronavirus disease 2019 (COVID-19). Some people also need a vaccine to prevent pneumonia.  Can type 2 diabetes be prevented? -- Yes. To lower your chances of getting type 2 diabetes, the most important thing you can do is eat a healthy diet and get plenty of physical activity. This can help you lose weight if you are overweight. But eating well and being active are also good for your overall health. Even gentle activity, like walking, has benefits.  If you smoke, quitting can also lower your risk of type 2 diabetes. Quitting smoking can be hard to do, but your " "doctor or nurse can help.  All topics are updated as new evidence becomes available and our peer review process is complete.  This topic retrieved from [employer] on: Sep 21, 2021.  Topic 50611 Version 14.0  Release: 29.4.2 - C29.263  © 2021 UpToDate, Inc. and/or its affiliates. All rights reserved.  Consumer Information Use and Disclaimer   This information is not specific medical advice and does not replace information you receive from your health care provider. This is only a brief summary of general information. It does NOT include all information about conditions, illnesses, injuries, tests, procedures, treatments, therapies, discharge instructions or life-style choices that may apply to you. You must talk with your health care provider for complete information about your health and treatment options. This information should not be used to decide whether or not to accept your health care provider's advice, instructions or recommendations. Only your health care provider has the knowledge and training to provide advice that is right for you. The use of this information is governed by the [employer] End User License Agreement, available at https://www.[employer]/en/solutions/[employer]/about/nikita.The use of [employer] content is governed by the [employer] Terms of Use. ©2021 UpToDate, Inc. All rights reserved.  Copyright   © 2021 UpToDate, Inc. and/or its affiliates. All rights reserved.    Patient Education       High Blood Pressure in Adults   The Basics   Written by the doctors and editors at [facility]   What is high blood pressure? -- High blood pressure is a condition that puts you at risk for heart attack, stroke, and kidney disease. It does not usually cause symptoms. But it can be serious.  When your doctor or nurse tells you your blood pressure, they will say 2 numbers. For instance, your doctor or nurse might say that your blood pressure is "130 over 80." The top number is the pressure inside your arteries " "when your heart is anita. The bottom number is the pressure inside your arteries when your heart is relaxed.  "Elevated blood pressure" is a term doctors or nurses use as a warning. People with elevated blood pressure do not yet have high blood pressure. But their blood pressure is not as low as it should be for good health.  Many experts define high, elevated, and normal blood pressure as follows:  High - Top number of 130 or above and/or bottom number of 80 or above  Elevated - Top number between 120 and 129 and bottom number of 79 or below  Normal - Top number of 119 or below and bottom number of 79 or below  This information is also in the table (table 1).   How can I lower my blood pressure? -- If your doctor or nurse has prescribed blood pressure medicine, the most important thing you can do is to take it. If it causes side effects, do not just stop taking it. Instead, talk to your doctor or nurse about the problems it causes. They might be able to lower your dose or switch you to another medicine. If cost is a problem, mention that too. They might be able to put you on a less expensive medicine. Taking your blood pressure medicine can keep you from having a heart attack or stroke, and it can save your life!  Can I do anything on my own? -- You have a lot of control over your blood pressure. To lower it:  Lose weight (if you are overweight)  Choose a diet low in fat and rich in fruits, vegetables, and low-fat dairy products  Reduce the amount of salt you eat  Do something active for at least 30 minutes a day on most days of the week  Cut down on alcohol (if you drink more than 2 alcoholic drinks per day)  It's also a good idea to get a home blood pressure meter. People who check their own blood pressure at home do better at keeping it low and can sometimes even reduce the amount of medicine they take.  All topics are updated as new evidence becomes available and our peer review process is complete.  This " "topic retrieved from [employer] on: Sep 21, 2021.  Topic 94724 Version 15.0  Release: 29.4.2 - C29.263  © 2021 UpToDate, Inc. and/or its affiliates. All rights reserved.  table 1: Definition of normal and high blood pressure  Level  Top number  Bottom number    High 130 or above 80 or above   Elevated 120 to 129 79 or below   Normal 119 or below 79 or below   These definitions are from the American College of Cardiology/American Heart Association. Other expert groups might use slightly different definitions.  "Elevated blood pressure" is a term doctor or nurses use as a warning. It means you do not yet have high blood pressure, but your blood pressure is not as low as it should be for good health.  Graphic 64612 Version 6.0  Consumer Information Use and Disclaimer   This information is not specific medical advice and does not replace information you receive from your health care provider. This is only a brief summary of general information. It does NOT include all information about conditions, illnesses, injuries, tests, procedures, treatments, therapies, discharge instructions or life-style choices that may apply to you. You must talk with your health care provider for complete information about your health and treatment options. This information should not be used to decide whether or not to accept your health care provider's advice, instructions or recommendations. Only your health care provider has the knowledge and training to provide advice that is right for you. The use of this information is governed by the [employer] End User License Agreement, available at https://www.[employer].Neolinear/en/solutions/[employer]/about/nikita.The use of [employer] content is governed by the [employer] Terms of Use. ©2021 UpToDate, Inc. All rights reserved.  Copyright   © 2021 UpToDate, Inc. and/or its affiliates. All rights reserved.    "

## 2023-08-03 NOTE — PROGRESS NOTES
Subjective:      Norma Blue is a 33 y.o. female who presents for an annual exam.     She reports her periods are: {Period Status:34908}    Complains of vaginitis {YES (DEF)/NO:70940}    Hot Flashes: {gen none/mild/moderate/severe:474520}    Colonoscopy Due: {DUE:75637}    Last Dexa: {[facility] DEXA SCORE:60167}    Review of Systems  {ros; complete:46667}      Objective:      {exam; complete:43745}.           Assessment:     Encounter Diagnoses   Name Primary?    Type 2 diabetes mellitus without complication, with long-term current use of insulin     Essential hypertension     Type 2 diabetes mellitus with hyperglycemia, without long-term current use of insulin           Plan:       {gyn plan:98549}     No orders of the defined types were placed in this encounter.

## 2023-08-03 NOTE — LETTER
AUTHORIZATION FOR RELEASE OF   CONFIDENTIAL INFORMATION    Dear Dr Sreedhar Toledo    We are seeing Norma Blue, date of birth 1990, in the clinic at [facility]. Jocelynn Carolina MD is the patient's PCP. Norma Blue has an outstanding lab/procedure at the time we reviewed her chart. In order to help keep her health information updated, she has authorized us to request the following medical record(s):        (  )  MAMMOGRAM                                      (  )  COLONOSCOPY      (  )  PAP SMEAR                                          (  )  OUTSIDE LAB RESULTS     (  )  DEXA SCAN                                          ( x )  EYE EXAM            (  )  FOOT EXAM                                          (  )  ENTIRE RECORD     (  )  OUTSIDE IMMUNIZATIONS                 (  )  _______________         Please fax records to [facility], [place], Krista L., MD, [phone]     If you have any questions, please contact Renetta Holland [phone]..           Patient Name: Norma Blue  : 1990  Patient Phone #: [phone]

## 2023-08-04 ENCOUNTER — PATIENT OUTREACH (OUTPATIENT)
Dept: ADMINISTRATIVE | Facility: HOSPITAL | Age: 33
End: 2023-08-04

## 2023-08-04 DIAGNOSIS — R73.9 HYPERGLYCEMIA: Primary | ICD-10-CM

## 2023-08-04 LAB
EST. AVERAGE GLUCOSE BLD GHB EST-MCNC: 104 MG/DL
HBA1C MFR BLD HPLC: 5.7 % (ref 4.5–6.6)

## 2023-08-04 PROCEDURE — 83036 HEMOGLOBIN GLYCOSYLATED A1C: CPT | Mod: ,,, | Performed by: CLINICAL MEDICAL LABORATORY

## 2023-08-04 PROCEDURE — 83036 HEMOGLOBIN A1C: ICD-10-PCS | Mod: ,,, | Performed by: CLINICAL MEDICAL LABORATORY

## 2023-08-04 NOTE — PROGRESS NOTES
Post visit Population Health review of encounter with date of service  8/8/24 with Caity.   Not all required HY components in encounter. Needs 1) primary Dx as z00.00 or z00.01 in place of screening for DM . 2) needs CPT 33590 message sent to provider's staff via staff message. Olamide     Followup appt for: 8/8/24 HY 2024.

## 2023-08-07 LAB
GH SERPL-MCNC: ABNORMAL NG/ML
INSULIN SERPL-ACNC: ABNORMAL U[IU]/ML
LAB AP CLINICAL INFORMATION: ABNORMAL
LAB AP GYN INTERPRETATION: ABNORMAL
LAB AP PAP DISCLAIMER COMMENTS: ABNORMAL
RENIN PLAS-CCNC: ABNORMAL NG/ML/H

## 2023-08-07 RX ORDER — NITROFURANTOIN 25; 75 MG/1; MG/1
100 CAPSULE ORAL 2 TIMES DAILY
Qty: 6 CAPSULE | Refills: 0 | OUTPATIENT
Start: 2023-08-07

## 2023-08-09 DIAGNOSIS — R87.619 ABNORMAL CERVICAL PAPANICOLAOU SMEAR, UNSPECIFIED ABNORMAL PAP FINDING: Primary | ICD-10-CM

## 2023-08-11 PROBLEM — I10 ESSENTIAL HYPERTENSION: Status: ACTIVE | Noted: 2023-08-11

## 2023-08-11 RX ORDER — SEMAGLUTIDE 0.68 MG/ML
0.5 INJECTION, SOLUTION SUBCUTANEOUS
Qty: 1 EACH | Refills: 2 | Status: SHIPPED | OUTPATIENT
Start: 2023-08-11 | End: 2023-09-13 | Stop reason: SDUPTHER

## 2023-08-11 RX ORDER — LANCETS 33 GAUGE
1 EACH MISCELLANEOUS DAILY
Qty: 100 EACH | Refills: 3 | Status: SHIPPED | OUTPATIENT
Start: 2023-08-11

## 2023-08-11 RX ORDER — METFORMIN HYDROCHLORIDE 500 MG/1
500 TABLET, EXTENDED RELEASE ORAL
Qty: 90 TABLET | Refills: 1 | Status: SHIPPED | OUTPATIENT
Start: 2023-08-11 | End: 2023-09-13 | Stop reason: SDUPTHER

## 2023-08-11 RX ORDER — LISINOPRIL 40 MG/1
40 TABLET ORAL DAILY
Qty: 90 TABLET | Refills: 1 | Status: SHIPPED | OUTPATIENT
Start: 2023-08-11 | End: 2023-09-13 | Stop reason: SDUPTHER

## 2023-08-11 NOTE — PROGRESS NOTES
"Subjective     Patient ID: Norma Blue is a 33 y.o. female.    Chief Complaint: Diabetes, Hypertension, and Healthy You (Patient stated she is here for a [employer] [place] Healthy You Visit. She is "fasting" for labs and needs renewal on all her prescriptions. She has never had a PAP in the past. Recently had diabetic eye exam. Records were requested. )    Patient is here for a [employer] healthy you. Patient needs refills.           Review of Systems   Constitutional:  Negative for fatigue and fever.   HENT:  Negative for ear pain, postnasal drip, rhinorrhea and sinus pressure/congestion.    Respiratory:  Negative for cough and shortness of breath.    Cardiovascular:  Negative for chest pain.   Gastrointestinal:  Negative for abdominal pain, diarrhea, nausea and vomiting.   Genitourinary:  Negative for dysuria.   Neurological:  Negative for headaches.       Tobacco Use: Medium Risk (8/3/2023)    Patient History     Smoking Tobacco Use: Former     Smokeless Tobacco Use: Never     Passive Exposure: Past     Review of patient's allergies indicates:   Allergen Reactions    Sulfamethoxazole-trimethoprim Rash     Current Outpatient Medications   Medication Instructions    ACCU-CHEK GUIDE GLUCOSE METER Misc USE 1 ONCE DAILY DUE TO DIABETES    ACCU-CHEK SOFTCLIX LANCETS Misc USE 1 TO CHECK GLUCOSE ONCE DAILY    blood sugar diagnostic Strp 1 strip, Misc.(Non-Drug; Combo Route), Daily    blood-glucose meter kit Patient to test one time daily due to diabetes E 11.9    lancets (TRUEPLUS LANCETS) 33 gauge Misc 1 lancet , Misc.(Non-Drug; Combo Route), Daily    lisinopriL (PRINIVIL,ZESTRIL) 40 mg, Oral, Daily    metFORMIN (GLUCOPHAGE-XR) 500 mg, Oral, With breakfast    OZEMPIC 0.5 mg, Subcutaneous, Every 7 days     Medications Discontinued During This Encounter   Medication Reason    metFORMIN (GLUCOPHAGE-XR) 500 MG ER 24hr tablet Reorder    lisinopriL (PRINIVIL,ZESTRIL) 40 MG tablet Reorder    blood sugar diagnostic " "Strp Reorder    lancets (TRUEPLUS LANCETS) 33 gauge Misc Reorder    semaglutide (OZEMPIC) 0.25 mg or 0.5 mg (2 mg/3 mL) pen injector Reorder       Past Medical History:   Diagnosis Date    Diabetes mellitus, type 2     Hypertension      Health Maintenance Topics with due status: Not Due       Topic Last Completion Date    Influenza Vaccine 05/04/2023    Eye Exam 06/23/2023    Cervical Cancer Screening 08/03/2023    Lipid Panel 08/03/2023    Hemoglobin A1c 08/04/2023       There is no immunization history on file for this patient.    Objective     Body mass index is 56.17 kg/m².  Wt Readings from Last 3 Encounters:   08/03/23 (!) 157.9 kg (348 lb)   05/17/23 (!) 158.3 kg (349 lb)   05/10/23 (!) 164.2 kg (362 lb)     Ht Readings from Last 3 Encounters:   08/03/23 5' 6" (1.676 m)   05/17/23 5' 6" (1.676 m)   05/10/23 5' 6" (1.676 m)     BP Readings from Last 3 Encounters:   08/03/23 (!) 150/99   05/17/23 135/86   05/10/23 (!) 160/96     Temp Readings from Last 3 Encounters:   08/03/23 98 °F (36.7 °C) (Oral)   05/17/23 98.3 °F (36.8 °C) (Oral)   05/10/23 98 °F (36.7 °C) (Oral)     Pulse Readings from Last 3 Encounters:   08/03/23 76   05/17/23 88   05/10/23 85     Resp Readings from Last 3 Encounters:   08/03/23 18   05/17/23 18   05/10/23 18     PF Readings from Last 3 Encounters:   No data found for PF       Physical Exam  Exam conducted with a chaperone present.   HENT:      Head: Normocephalic and atraumatic.   Cardiovascular:      Rate and Rhythm: Normal rate and regular rhythm.   Pulmonary:      Effort: Pulmonary effort is normal.      Breath sounds: Normal breath sounds.   Chest:      Chest wall: No mass, deformity, swelling or tenderness.   Breasts:     Right: Normal.      Left: Normal.   Genitourinary:     General: Normal vulva.      Vagina: Normal.      Cervix: Normal.      Uterus: Normal.       Adnexa: Right adnexa normal and left adnexa normal.   Lymphadenopathy:      Upper Body:      Right upper body: No " supraclavicular, axillary or pectoral adenopathy.      Left upper body: No supraclavicular, axillary or pectoral adenopathy.   Neurological:      Mental Status: She is alert and oriented to person, place, and time.   Psychiatric:         Mood and Affect: Mood normal.         Behavior: Behavior normal.         Assessment and Plan     Problem List Items Addressed This Visit          Cardiac/Vascular    Essential hypertension    Relevant Medications    lisinopriL (PRINIVIL,ZESTRIL) 40 MG tablet       Endocrine    Type 2 diabetes mellitus with hyperglycemia, without long-term current use of insulin    Relevant Medications    semaglutide (OZEMPIC) 0.25 mg or 0.5 mg (2 mg/3 mL) pen injector    metFORMIN (GLUCOPHAGE-XR) 500 MG ER 24hr tablet    lancets (TRUEPLUS LANCETS) 33 gauge Misc    blood sugar diagnostic Strp    BMI 50.0-59.9, adult     Other Visit Diagnoses       Annual visit for general adult medical examination with abnormal findings    -  Primary    Encounter for screening for diabetes mellitus        Relevant Orders    Glucose, Fasting (Completed)    Encounter for screening for lipid disorder        Relevant Orders    Lipid Panel (Completed)    Screening for malignant neoplasm of cervix        Relevant Orders    ThinPrep Pap Test (Completed)    Hyperglycemia                Plan: Refill medications. Labs done. Educational material given.  Believe elevated blood pressure is due to patient's anxiety about her first pap. Will recheck at next visit.      I have reviewed the medications, allergies, and problem list.     Goal Actions:    What type of visit is the patient here for today?: Healthy You  Does the patient consent to enroll in [employer] Healthy?: Yes  Is this a Wellness Follow Up?: No  What is your overall wellness goal? (select at least one): Lifestyle modifications, Lose weight, Understand disease process, Improve overall health  Choose 3: Biometric, Lifestyle, Nutrition  Biometric Actions: Attend regularly  scheduled office visits  Lifestyle Actions : Take medications as prescribed  Nurtrition Actions: Eat heart healthy diet

## 2023-09-13 DIAGNOSIS — E11.65 TYPE 2 DIABETES MELLITUS WITH HYPERGLYCEMIA, WITHOUT LONG-TERM CURRENT USE OF INSULIN: ICD-10-CM

## 2023-09-13 DIAGNOSIS — I10 ESSENTIAL HYPERTENSION: ICD-10-CM

## 2023-09-15 RX ORDER — SEMAGLUTIDE 0.68 MG/ML
0.5 INJECTION, SOLUTION SUBCUTANEOUS
Qty: 1 EACH | Refills: 2 | Status: SHIPPED | OUTPATIENT
Start: 2023-09-15 | End: 2024-03-01 | Stop reason: SDUPTHER

## 2023-09-15 RX ORDER — METFORMIN HYDROCHLORIDE 500 MG/1
500 TABLET, EXTENDED RELEASE ORAL
Qty: 90 TABLET | Refills: 1 | Status: SHIPPED | OUTPATIENT
Start: 2023-09-15 | End: 2024-03-01 | Stop reason: SDUPTHER

## 2023-09-15 RX ORDER — LISINOPRIL 40 MG/1
40 TABLET ORAL DAILY
Qty: 90 TABLET | Refills: 1 | Status: SHIPPED | OUTPATIENT
Start: 2023-09-15 | End: 2024-03-01 | Stop reason: SDUPTHER

## 2024-03-01 DIAGNOSIS — I10 ESSENTIAL HYPERTENSION: ICD-10-CM

## 2024-03-01 DIAGNOSIS — E11.65 TYPE 2 DIABETES MELLITUS WITH HYPERGLYCEMIA, WITHOUT LONG-TERM CURRENT USE OF INSULIN: ICD-10-CM

## 2024-03-01 RX ORDER — SEMAGLUTIDE 0.68 MG/ML
0.5 INJECTION, SOLUTION SUBCUTANEOUS
Qty: 1 EACH | Refills: 0 | Status: SHIPPED | OUTPATIENT
Start: 2024-03-01 | End: 2024-03-11 | Stop reason: SDUPTHER

## 2024-03-01 RX ORDER — LISINOPRIL 40 MG/1
40 TABLET ORAL DAILY
Qty: 30 TABLET | Refills: 0 | Status: SHIPPED | OUTPATIENT
Start: 2024-03-01 | End: 2024-03-11 | Stop reason: SDUPTHER

## 2024-03-01 RX ORDER — METFORMIN HYDROCHLORIDE 500 MG/1
500 TABLET, EXTENDED RELEASE ORAL
Qty: 30 TABLET | Refills: 0 | Status: SHIPPED | OUTPATIENT
Start: 2024-03-01 | End: 2024-03-11 | Stop reason: SDUPTHER

## 2024-03-01 NOTE — TELEPHONE ENCOUNTER
Patient has an apt set up for next week for a chweck up. She needs a refill on Ozempic, metformin and lisinopril.

## 2024-03-11 ENCOUNTER — OFFICE VISIT (OUTPATIENT)
Dept: FAMILY MEDICINE | Facility: CLINIC | Age: 34
End: 2024-03-11

## 2024-03-11 VITALS
HEART RATE: 89 BPM | TEMPERATURE: 99 F | SYSTOLIC BLOOD PRESSURE: 146 MMHG | DIASTOLIC BLOOD PRESSURE: 92 MMHG | RESPIRATION RATE: 18 BRPM | BODY MASS INDEX: 47.09 KG/M2 | OXYGEN SATURATION: 100 % | WEIGHT: 293 LBS | HEIGHT: 66 IN

## 2024-03-11 DIAGNOSIS — I10 ESSENTIAL HYPERTENSION: ICD-10-CM

## 2024-03-11 DIAGNOSIS — E11.65 TYPE 2 DIABETES MELLITUS WITH HYPERGLYCEMIA, WITHOUT LONG-TERM CURRENT USE OF INSULIN: ICD-10-CM

## 2024-03-11 LAB
ALBUMIN SERPL BCP-MCNC: 3 G/DL (ref 3.5–5)
ALBUMIN/GLOB SERPL: 0.5 {RATIO}
ALP SERPL-CCNC: 75 U/L (ref 37–98)
ALT SERPL W P-5'-P-CCNC: 25 U/L (ref 13–56)
ANION GAP SERPL CALCULATED.3IONS-SCNC: 10 MMOL/L (ref 7–16)
AST SERPL W P-5'-P-CCNC: 23 U/L (ref 15–37)
BASOPHILS # BLD AUTO: 0.04 K/UL (ref 0–0.2)
BASOPHILS NFR BLD AUTO: 0.5 % (ref 0–1)
BILIRUB SERPL-MCNC: 0.2 MG/DL (ref ?–1.2)
BUN SERPL-MCNC: 12 MG/DL (ref 7–18)
BUN/CREAT SERPL: 14 (ref 6–20)
CALCIUM SERPL-MCNC: 9.1 MG/DL (ref 8.5–10.1)
CHLORIDE SERPL-SCNC: 100 MMOL/L (ref 98–107)
CHOLEST SERPL-MCNC: 209 MG/DL (ref 0–200)
CHOLEST/HDLC SERPL: 5.4 {RATIO}
CO2 SERPL-SCNC: 27 MMOL/L (ref 21–32)
CREAT SERPL-MCNC: 0.86 MG/DL (ref 0.55–1.02)
CREAT UR-MCNC: 78 MG/DL (ref 28–219)
DIFFERENTIAL METHOD BLD: ABNORMAL
EGFR (NO RACE VARIABLE) (RUSH/TITUS): 92 ML/MIN/1.73M2
EOSINOPHIL # BLD AUTO: 0.1 K/UL (ref 0–0.5)
EOSINOPHIL NFR BLD AUTO: 1.3 % (ref 1–4)
ERYTHROCYTE [DISTWIDTH] IN BLOOD BY AUTOMATED COUNT: 13.1 % (ref 11.5–14.5)
EST. AVERAGE GLUCOSE BLD GHB EST-MCNC: 214 MG/DL
GLOBULIN SER-MCNC: 5.6 G/DL (ref 2–4)
GLUCOSE SERPL-MCNC: 323 MG/DL (ref 74–106)
HBA1C MFR BLD HPLC: 9.1 % (ref 4.5–6.6)
HCT VFR BLD AUTO: 40.7 % (ref 38–47)
HDLC SERPL-MCNC: 39 MG/DL (ref 40–60)
HGB BLD-MCNC: 14 G/DL (ref 12–16)
IMM GRANULOCYTES # BLD AUTO: 0.01 K/UL (ref 0–0.04)
IMM GRANULOCYTES NFR BLD: 0.1 % (ref 0–0.4)
LDLC SERPL CALC-MCNC: 135 MG/DL
LDLC/HDLC SERPL: 3.5 {RATIO}
LYMPHOCYTES # BLD AUTO: 3.1 K/UL (ref 1–4.8)
LYMPHOCYTES NFR BLD AUTO: 39 % (ref 27–41)
MCH RBC QN AUTO: 29.9 PG (ref 27–31)
MCHC RBC AUTO-ENTMCNC: 34.4 G/DL (ref 32–36)
MCV RBC AUTO: 86.8 FL (ref 80–96)
MICROALBUMIN UR-MCNC: 80.3 MG/DL (ref 0–2.8)
MICROALBUMIN/CREAT RATIO PNL UR: 1029.5 MG/G (ref 0–30)
MONOCYTES # BLD AUTO: 0.58 K/UL (ref 0–0.8)
MONOCYTES NFR BLD AUTO: 7.3 % (ref 2–6)
MPC BLD CALC-MCNC: 10.7 FL (ref 9.4–12.4)
NEUTROPHILS # BLD AUTO: 4.12 K/UL (ref 1.8–7.7)
NEUTROPHILS NFR BLD AUTO: 51.8 % (ref 53–65)
NONHDLC SERPL-MCNC: 170 MG/DL
NRBC # BLD AUTO: 0 X10E3/UL
NRBC, AUTO (.00): 0 %
PLATELET # BLD AUTO: 386 K/UL (ref 150–400)
POTASSIUM SERPL-SCNC: 3.9 MMOL/L (ref 3.5–5.1)
PROT SERPL-MCNC: 8.6 G/DL (ref 6.4–8.2)
RBC # BLD AUTO: 4.69 M/UL (ref 4.2–5.4)
SODIUM SERPL-SCNC: 133 MMOL/L (ref 136–145)
TRIGL SERPL-MCNC: 177 MG/DL (ref 35–150)
VLDLC SERPL-MCNC: 35 MG/DL
WBC # BLD AUTO: 7.95 K/UL (ref 4.5–11)

## 2024-03-11 PROCEDURE — 83036 HEMOGLOBIN GLYCOSYLATED A1C: CPT | Mod: ,,, | Performed by: CLINICAL MEDICAL LABORATORY

## 2024-03-11 PROCEDURE — 82043 UR ALBUMIN QUANTITATIVE: CPT | Mod: ,,, | Performed by: CLINICAL MEDICAL LABORATORY

## 2024-03-11 PROCEDURE — 85025 COMPLETE CBC W/AUTO DIFF WBC: CPT | Mod: ,,, | Performed by: CLINICAL MEDICAL LABORATORY

## 2024-03-11 PROCEDURE — 82570 ASSAY OF URINE CREATININE: CPT | Mod: ,,, | Performed by: CLINICAL MEDICAL LABORATORY

## 2024-03-11 PROCEDURE — 80061 LIPID PANEL: CPT | Mod: ,,, | Performed by: CLINICAL MEDICAL LABORATORY

## 2024-03-11 PROCEDURE — 80053 COMPREHEN METABOLIC PANEL: CPT | Mod: ,,, | Performed by: CLINICAL MEDICAL LABORATORY

## 2024-03-11 PROCEDURE — 99214 OFFICE O/P EST MOD 30 MIN: CPT | Mod: ,,, | Performed by: FAMILY MEDICINE

## 2024-03-11 RX ORDER — SEMAGLUTIDE 0.68 MG/ML
0.5 INJECTION, SOLUTION SUBCUTANEOUS
Qty: 1 EACH | Refills: 1 | Status: SHIPPED | OUTPATIENT
Start: 2024-03-11 | End: 2024-06-03 | Stop reason: SDUPTHER

## 2024-03-11 RX ORDER — METFORMIN HYDROCHLORIDE 500 MG/1
500 TABLET, EXTENDED RELEASE ORAL
Qty: 30 TABLET | Refills: 5 | Status: SHIPPED | OUTPATIENT
Start: 2024-03-11 | End: 2024-06-03 | Stop reason: SDUPTHER

## 2024-03-11 RX ORDER — HYDROCHLOROTHIAZIDE 12.5 MG/1
12.5 TABLET ORAL DAILY
Qty: 30 TABLET | Refills: 2 | Status: SHIPPED | OUTPATIENT
Start: 2024-03-11 | End: 2024-04-03 | Stop reason: ALTCHOICE

## 2024-03-11 RX ORDER — LISINOPRIL 40 MG/1
40 TABLET ORAL DAILY
Qty: 30 TABLET | Refills: 5 | Status: SHIPPED | OUTPATIENT
Start: 2024-03-11 | End: 2024-06-03 | Stop reason: SDUPTHER

## 2024-03-11 NOTE — PROGRESS NOTES
"New Clinic Note    Norma Blue is a 34 y.o. female     CC:   Chief Complaint   Patient presents with    Hypertension     Patient stated she is here for a follow up on elevated blood pressure. Patient needs to see GYN for abnormal PAP. She was a "no show" with Dr Rashid back on 08/21/2024 at 1:30. Will make another appointment. She is self pay today until she can check with her insurance [employer]. Blood pressure is elevated today. Patient denies CP, SOB or swelling of feet or legs. She only on Lisinopril and Metformin. Never went and picked up the Ozempic pen injector.     Follow-up        Subjective    History of Present Illness    Patient is for evaluation of chronic medical problems. Patient needs refills. Norma  is tolerating medications well without side effects.   Patient has been off her Ozempic for 2 months due to insurance. Patients blood pressure is elevated today. Patient denies headaches and chest pain.     Current Outpatient Medications:     ACCU-CHEK GUIDE GLUCOSE METER [facility], USE 1 ONCE DAILY DUE TO DIABETES, Disp: , Rfl:     ACCU-CHEK SOFTCLIX LANCETS [facility], USE 1 TO CHECK GLUCOSE ONCE DAILY, Disp: , Rfl:     blood sugar diagnostic Strp, 1 strip by Misc.(Non-Drug; Combo Route) route once daily., Disp: 100 strip, Rfl: 3    blood-glucose meter kit, Patient to test one time daily due to diabetes E 11.9, Disp: 1 each, Rfl: 0    lancets (TRUEPLUS LANCETS) 33 gauge Misc, 1 lancet  by Misc.(Non-Drug; Combo Route) route once daily., Disp: 100 each, Rfl: 3    naproxen (NAPROSYN) 500 MG tablet, Take 500 mg by mouth 2 (two) times daily., Disp: , Rfl:     hydroCHLOROthiazide (HYDRODIURIL) 12.5 MG Tab, Take 1 tablet (12.5 mg total) by mouth once daily., Disp: 30 tablet, Rfl: 2    lisinopriL (PRINIVIL,ZESTRIL) 40 MG tablet, Take 1 tablet (40 mg total) by mouth once daily., Disp: 30 tablet, Rfl: 5    metFORMIN (GLUCOPHAGE-XR) 500 MG ER 24hr tablet, Take 1 tablet (500 mg total) by mouth daily with breakfast., " "Disp: 30 tablet, Rfl: 5    semaglutide (OZEMPIC) 0.25 mg or 0.5 mg (2 mg/3 mL) pen injector, Inject 0.5 mg into the skin every 7 days., Disp: 1 each, Rfl: 1     Past Medical History:   Diagnosis Date    Diabetes mellitus, type 2     Hypertension         Family History   Problem Relation Age of Onset    Hypertension Mother     Diabetes Father     Cancer Maternal Grandmother     Hypertension Maternal Grandmother         Past Surgical History:   Procedure Laterality Date    spleenectomy          Social History     Socioeconomic History    Marital status: Single    Number of children: 0   Tobacco Use    Smoking status: Former     Average packs/day: 1 pack/day for 21.0 years (20.4 ttl pk-yrs)     Types: Cigarettes     Start date: 3/5/2003     Passive exposure: Past    Smokeless tobacco: Never   Substance and Sexual Activity    Alcohol use: Never    Drug use: Not Currently    Sexual activity: Not Currently     Comment: Never had a PAP   Social History Narrative    Single. Lives with her mother. Works at [employer] in [employer].        Review of Systems   Constitutional:  Negative for fatigue and fever.   HENT:  Negative for ear pain, postnasal drip, rhinorrhea and sinus pressure/congestion.    Respiratory:  Negative for cough and shortness of breath.    Cardiovascular:  Negative for chest pain.   Gastrointestinal:  Negative for abdominal pain, diarrhea, nausea and vomiting.   Genitourinary:  Negative for dysuria.   Neurological:  Negative for headaches.        BP (!) 146/92 (BP Location: Left arm, Patient Position: Sitting)   Pulse 89   Temp 99.4 °F (37.4 °C) (Oral)   Resp 18   Ht 5' 6" (1.676 m)   Wt (!) 166 kg (366 lb)   LMP 03/11/2024   SpO2 100%   BMI 59.07 kg/m²      Physical Exam  HENT:      Head: Normocephalic and atraumatic.   Cardiovascular:      Rate and Rhythm: Normal rate and regular rhythm.   Pulmonary:      Effort: Pulmonary effort is normal.      Breath sounds: Normal breath sounds.   Neurological:      " Mental Status: She is alert and oriented to person, place, and time.   Psychiatric:         Mood and Affect: Mood normal.         Behavior: Behavior normal.          Assessment and Plan      ICD-10-CM ICD-9-CM   1. Essential hypertension  I10 401.9   2. Type 2 diabetes mellitus with hyperglycemia, without long-term current use of insulin  E11.65 250.00     790.29        1. Essential hypertension  Not controlled. Add HCTZ to meds. Will recheck in 3 weeks.   -     lisinopriL (PRINIVIL,ZESTRIL) 40 MG tablet; Take 1 tablet (40 mg total) by mouth once daily.  Dispense: 30 tablet; Refill: 5  -     hydroCHLOROthiazide (HYDRODIURIL) 12.5 MG Tab; Take 1 tablet (12.5 mg total) by mouth once daily.  Dispense: 30 tablet; Refill: 2  -     Lipid Panel; Future; Expected date: 03/11/2024  -     Comprehensive Metabolic Panel; Future; Expected date: 03/11/2024  -     CBC Auto Differential; Future; Expected date: 03/11/2024    2. Type 2 diabetes mellitus with hyperglycemia, without long-term current use of insulin  Controlled at last check but patient has not been taking her Ozempic. Recheck labs today. Refill medications.   -     metFORMIN (GLUCOPHAGE-XR) 500 MG ER 24hr tablet; Take 1 tablet (500 mg total) by mouth daily with breakfast.  Dispense: 30 tablet; Refill: 5  -     semaglutide (OZEMPIC) 0.25 mg or 0.5 mg (2 mg/3 mL) pen injector; Inject 0.5 mg into the skin every 7 days.  Dispense: 1 each; Refill: 1  -     Microalbumin/Creatinine Ratio, Urine  -     Hemoglobin A1C; Future; Expected date: 03/11/2024      Follow up in about 3 weeks (around 4/1/2024).       This information was created by a scribe under my supervision and in my presence. I have reviewed and agree with the scribe's documentation.

## 2024-04-03 ENCOUNTER — OFFICE VISIT (OUTPATIENT)
Dept: FAMILY MEDICINE | Facility: CLINIC | Age: 34
End: 2024-04-03

## 2024-04-03 VITALS
HEART RATE: 89 BPM | DIASTOLIC BLOOD PRESSURE: 98 MMHG | SYSTOLIC BLOOD PRESSURE: 142 MMHG | WEIGHT: 293 LBS | HEIGHT: 66 IN | RESPIRATION RATE: 18 BRPM | BODY MASS INDEX: 47.09 KG/M2 | TEMPERATURE: 100 F | OXYGEN SATURATION: 97 %

## 2024-04-03 DIAGNOSIS — Z23 ENCOUNTER FOR IMMUNIZATION: ICD-10-CM

## 2024-04-03 DIAGNOSIS — E11.65 TYPE 2 DIABETES MELLITUS WITH HYPERGLYCEMIA, WITHOUT LONG-TERM CURRENT USE OF INSULIN: ICD-10-CM

## 2024-04-03 DIAGNOSIS — I10 ESSENTIAL HYPERTENSION: Primary | ICD-10-CM

## 2024-04-03 PROCEDURE — 99214 OFFICE O/P EST MOD 30 MIN: CPT | Mod: 25,,, | Performed by: FAMILY MEDICINE

## 2024-04-03 PROCEDURE — 90471 IMMUNIZATION ADMIN: CPT | Mod: ,,, | Performed by: FAMILY MEDICINE

## 2024-04-03 PROCEDURE — 90677 PCV20 VACCINE IM: CPT | Mod: ,,, | Performed by: FAMILY MEDICINE

## 2024-04-03 RX ORDER — HYDROCHLOROTHIAZIDE 25 MG/1
25 TABLET ORAL DAILY
Qty: 30 TABLET | Refills: 5 | Status: SHIPPED | OUTPATIENT
Start: 2024-04-03 | End: 2024-06-03 | Stop reason: SDUPTHER

## 2024-04-03 NOTE — LETTER
April 3, 2024      [facility] [address] [place] [address]  Phone: [phone]  Fax: [phone]       Patient: Norma Blue   YOB: 1990  Date of Visit: 04/03/2024    To Whom It May Concern:    Elizabeth Blue  was at [facility] on 04/03/2024. The patient was brought in to clinic by Ava Weber. She needs to be excused from work today and may return to work on 03/04/2024 with no  restrictions. If you have any questions or concerns, or if I can be of further assistance, please do not hesitate to contact me.    Sincerely,    Ava Matt LPN

## 2024-04-03 NOTE — LETTER
April 3, 2024      [facility] [address] [place] [address]  Phone: [phone]  Fax: [phone]       Patient: Norma Blue   YOB: 1990  Date of Visit: 04/03/2024    To Whom It May Concern:    Elizabeth Blue  was at [facility] on 04/03/2024. The patient may return to work/school on 04/04/2024 with no restrictions. If you have any questions or concerns, or if I can be of further assistance, please do not hesitate to contact me.    Sincerely,    Ava Matt LPN

## 2024-04-03 NOTE — PROGRESS NOTES
New Clinic Note    Norma Blue is a 34 y.o. female     CC:   Chief Complaint   Patient presents with    Diabetes     Patient stated she is here for a three week follow up on elevated blood pressure and elevated blood sugar. Patient just started at [facility] and does not have insurance and her A1C was elevated 9.1. She has not picked up the Ozempic pen yet. Her Sodium was low also at 133 and stated she saw her labs on [facility] and wants to discuss all of her labs this am. Stated her cholesterol was elevated and she stated she had been fasting. Made an appointment with GYN for abnormal PAP for 04/18/2024 with Dr Rashid.    Hypertension    Follow-up        Subjective    History of Present Illness    Patient is here for her three week follow up on elevated blood pressure and blood sugar. Patient is tolerating medication well but still continues to have elevated blood pressure. Patient does not check her blood pressure at home. She has not been able to start Ozempic due to not having any insurance.     Current Outpatient Medications:     ACCU-CHEK GUIDE GLUCOSE METER [facility], USE 1 ONCE DAILY DUE TO DIABETES, Disp: , Rfl:     ACCU-CHEK SOFTCLIX LANCETS [facility], USE 1 TO CHECK GLUCOSE ONCE DAILY, Disp: , Rfl:     blood sugar diagnostic Strp, 1 strip by Misc.(Non-Drug; Combo Route) route once daily., Disp: 100 strip, Rfl: 3    blood-glucose meter kit, Patient to test one time daily due to diabetes E 11.9, Disp: 1 each, Rfl: 0    lancets (TRUEPLUS LANCETS) 33 gauge Misc, 1 lancet  by Misc.(Non-Drug; Combo Route) route once daily., Disp: 100 each, Rfl: 3    lisinopriL (PRINIVIL,ZESTRIL) 40 MG tablet, Take 1 tablet (40 mg total) by mouth once daily., Disp: 30 tablet, Rfl: 5    metFORMIN (GLUCOPHAGE-XR) 500 MG ER 24hr tablet, Take 1 tablet (500 mg total) by mouth daily with breakfast., Disp: 30 tablet, Rfl: 5    naproxen (NAPROSYN) 500 MG tablet, Take 500 mg by mouth 2 (two) times daily., Disp: , Rfl:     hydroCHLOROthiazide  "(HYDRODIURIL) 25 MG tablet, Take 1 tablet (25 mg total) by mouth once daily., Disp: 30 tablet, Rfl: 5    semaglutide (OZEMPIC) 0.25 mg or 0.5 mg (2 mg/3 mL) pen injector, Inject 0.5 mg into the skin every 7 days. (Patient not taking: Reported on 4/3/2024), Disp: 1 each, Rfl: 1     Past Medical History:   Diagnosis Date    Diabetes mellitus, type 2     Hypertension         Family History   Problem Relation Name Age of Onset    Hypertension Mother      Diabetes Father      Cancer Maternal Grandmother      Hypertension Maternal Grandmother          Past Surgical History:   Procedure Laterality Date    spleenectomy          Social History     Socioeconomic History    Marital status: Single    Number of children: 0   Tobacco Use    Smoking status: Former     Average packs/day: 1 pack/day for 20.4 years (20.4 ttl pk-yrs)     Types: Cigarettes     Start date: 3/5/2003     Passive exposure: Past    Smokeless tobacco: Never   Substance and Sexual Activity    Alcohol use: Never    Drug use: Not Currently    Sexual activity: Not Currently     Comment: Never had a PAP   Social History Narrative    Single. Lives with her mother. Works at [employer] in [employer].        Review of Systems   Constitutional:  Negative for fatigue and fever.   HENT:  Negative for ear pain, postnasal drip, rhinorrhea and sinus pressure/congestion.    Respiratory:  Negative for cough and shortness of breath.    Cardiovascular:  Negative for chest pain.   Gastrointestinal:  Negative for abdominal pain, diarrhea, nausea and vomiting.   Genitourinary:  Negative for dysuria.   Neurological:  Negative for headaches.        BP (!) 142/98 (BP Location: Right arm, Patient Position: Sitting)   Pulse 89   Temp 100.1 °F (37.8 °C) (Oral)   Resp 18   Ht 5' 6" (1.676 m)   Wt (!) 162.8 kg (359 lb)   LMP 03/11/2024   SpO2 97%   BMI 57.94 kg/m²      Physical Exam  HENT:      Head: Normocephalic and atraumatic.   Cardiovascular:      Rate and Rhythm: Normal rate and " regular rhythm.   Pulmonary:      Effort: Pulmonary effort is normal.      Breath sounds: Normal breath sounds.   Neurological:      Mental Status: She is alert and oriented to person, place, and time.   Psychiatric:         Mood and Affect: Mood normal.         Behavior: Behavior normal.          Assessment and Plan      ICD-10-CM ICD-9-CM   1. Essential hypertension  I10 401.9   2. Encounter for immunization  Z23 V03.89   3. Type 2 diabetes mellitus with hyperglycemia, without long-term current use of insulin  E11.65 250.00     790.29        Essential hypertension  Not controlled. Increase HCTZ to 25mg. Follow up in 1 month with a blood pressure log.   -     hydroCHLOROthiazide (HYDRODIURIL) 25 MG tablet; Take 1 tablet (25 mg total) by mouth once daily.  Dispense: 30 tablet; Refill: 5    2. Encounter for immunization  -     pneumoc 20-carmelo conj-dip cr(PF) (PREVNAR-20 (PF)) injection Syrg 0.5 mL    3. Type 2 diabetes mellitus with hyperglycemia, without long-term current use of insulin  Not controlled at last visit. Patient has not gotten her Ozempic filled yet because she is awaiting her insurance coverage to begin. Recheck in 1 month.          Patient Instructions   Take Medications as directed  Monitor blood pressure outside of clinic  Eat a heart healthy diet and get plenty of cardiovascular exercise.       Follow up in about 1 month (around 5/3/2024).     This information was created by a scribe under my supervision and in my presence. I have reviewed and agree with the scribe's documentation.

## 2024-04-03 NOTE — LETTER
April 3, 2024      [facility] [address] [place] [address]  Phone: [phone]  Fax: [phone]       Patient: Norma Blue   YOB: 1990  Date of Visit: 04/03/2024    To Whom It May Concern:    Elizabeth Blue  was at [facility] on 04/03/2024. She was in to clinic by Ava Weber. She needs to be excused from work today and may return to work on 04/04/2024. If you have any questions or concerns, or if I can be of further assistance, please do not hesitate to contact me.    Sincerely,    Ava Matt LPN

## 2024-06-03 DIAGNOSIS — E11.65 TYPE 2 DIABETES MELLITUS WITH HYPERGLYCEMIA, WITHOUT LONG-TERM CURRENT USE OF INSULIN: ICD-10-CM

## 2024-06-03 DIAGNOSIS — I10 ESSENTIAL HYPERTENSION: ICD-10-CM

## 2024-06-05 RX ORDER — SEMAGLUTIDE 0.68 MG/ML
0.5 INJECTION, SOLUTION SUBCUTANEOUS
Qty: 1 EACH | Refills: 1 | Status: SHIPPED | OUTPATIENT
Start: 2024-06-05

## 2024-06-05 RX ORDER — LANCETS 33 GAUGE
1 EACH MISCELLANEOUS DAILY
Qty: 100 EACH | Refills: 3 | Status: SHIPPED | OUTPATIENT
Start: 2024-06-05

## 2024-06-05 RX ORDER — HYDROCHLOROTHIAZIDE 25 MG/1
25 TABLET ORAL DAILY
Qty: 30 TABLET | Refills: 5 | Status: SHIPPED | OUTPATIENT
Start: 2024-06-05 | End: 2025-06-05

## 2024-06-05 RX ORDER — METFORMIN HYDROCHLORIDE 500 MG/1
500 TABLET, EXTENDED RELEASE ORAL
Qty: 30 TABLET | Refills: 5 | Status: SHIPPED | OUTPATIENT
Start: 2024-06-05 | End: 2025-06-05

## 2024-06-05 RX ORDER — LISINOPRIL 40 MG/1
40 TABLET ORAL DAILY
Qty: 30 TABLET | Refills: 5 | Status: SHIPPED | OUTPATIENT
Start: 2024-06-05 | End: 2025-06-05

## 2024-06-06 ENCOUNTER — OFFICE VISIT (OUTPATIENT)
Dept: OBSTETRICS AND GYNECOLOGY | Facility: CLINIC | Age: 34
End: 2024-06-06
Payer: COMMERCIAL

## 2024-06-06 DIAGNOSIS — R87.612 LGSIL ON PAP SMEAR OF CERVIX: Primary | ICD-10-CM

## 2024-06-06 DIAGNOSIS — R87.619 ABNORMAL CERVICAL PAPANICOLAOU SMEAR, UNSPECIFIED ABNORMAL PAP FINDING: ICD-10-CM

## 2024-06-06 PROCEDURE — 57454 BX/CURETT OF CERVIX W/SCOPE: CPT | Mod: S$PBB,,, | Performed by: OBSTETRICS & GYNECOLOGY

## 2024-06-06 PROCEDURE — 3046F HEMOGLOBIN A1C LEVEL >9.0%: CPT | Mod: ,,, | Performed by: OBSTETRICS & GYNECOLOGY

## 2024-06-06 PROCEDURE — 88141 CYTOPATH C/V INTERPRET: CPT | Mod: ,,, | Performed by: PATHOLOGY

## 2024-06-06 PROCEDURE — 88305 TISSUE EXAM BY PATHOLOGIST: CPT | Mod: 26,,, | Performed by: PATHOLOGY

## 2024-06-06 PROCEDURE — 88342 IMHCHEM/IMCYTCHM 1ST ANTB: CPT | Mod: 26,,, | Performed by: PATHOLOGY

## 2024-06-06 PROCEDURE — 57454 BX/CURETT OF CERVIX W/SCOPE: CPT | Mod: PBBFAC | Performed by: OBSTETRICS & GYNECOLOGY

## 2024-06-06 PROCEDURE — 99202 OFFICE O/P NEW SF 15 MIN: CPT | Mod: S$PBB,25,, | Performed by: OBSTETRICS & GYNECOLOGY

## 2024-06-06 PROCEDURE — 99213 OFFICE O/P EST LOW 20 MIN: CPT | Mod: PBBFAC | Performed by: OBSTETRICS & GYNECOLOGY

## 2024-06-06 PROCEDURE — 4010F ACE/ARB THERAPY RXD/TAKEN: CPT | Mod: ,,, | Performed by: OBSTETRICS & GYNECOLOGY

## 2024-06-06 PROCEDURE — 3062F POS MACROALBUMINURIA REV: CPT | Mod: ,,, | Performed by: OBSTETRICS & GYNECOLOGY

## 2024-06-06 PROCEDURE — 3066F NEPHROPATHY DOC TX: CPT | Mod: ,,, | Performed by: OBSTETRICS & GYNECOLOGY

## 2024-06-06 PROCEDURE — 88305 TISSUE EXAM BY PATHOLOGIST: CPT | Mod: TC,SUR | Performed by: OBSTETRICS & GYNECOLOGY

## 2024-06-06 PROCEDURE — 88142 CYTOPATH C/V THIN LAYER: CPT | Mod: TC,GCY | Performed by: OBSTETRICS & GYNECOLOGY

## 2024-06-06 PROCEDURE — 1159F MED LIST DOCD IN RCRD: CPT | Mod: ,,, | Performed by: OBSTETRICS & GYNECOLOGY

## 2024-06-06 PROCEDURE — 87624 HPV HI-RISK TYP POOLED RSLT: CPT | Mod: ,,, | Performed by: CLINICAL MEDICAL LABORATORY

## 2024-06-07 LAB
LEFT EYE DM RETINOPATHY: NEGATIVE
RIGHT EYE DM RETINOPATHY: NEGATIVE

## 2024-06-07 NOTE — PROGRESS NOTES
Subjective:       Patient ID: Norma Blue is a 34 y.o. female.    Chief Complaint: Abnormal Pap Smear (Here for colposcopy-consent signed for procedure. )    Presents on referral from Dr. Min for colposcopic examination      Low-grade dysplasia noted on recent Pap done by Dr. Min       Review of Systems      Objective:      Physical Exam  Genitourinary:     Comments: External genitalia normal to appearance    Speculum exam revealed vaginal vault normal cervix grossly normal to appearance.     Squamocolumnar junction was easily seen 360°     The cervix was painted with ascetic acid.  5%.  Thorough colposcopic exam did reveal some moderately dense aceto-white area at 11 through 1:00 a.m..  There was no aceto-white areas seen within the endocervical canal.    Representative biopsies were taken at 11 1 and 6:00 a.m..  An endocervical curettage was also taken.  Very adequate specimens were obtained.    Monsel's solution applied excellent hemostasis noted        Assessment:       1. LGSIL on Pap smear of cervix    2. Abnormal cervical Papanicolaou smear, unspecified abnormal pap finding        Plan:       Patient Instructions   Discussed colposcopic examination.  Pathology report pending.      Follow-up me in 3-4 weeks.  We will decide further management thereafter.

## 2024-06-07 NOTE — PATIENT INSTRUCTIONS
Discussed colposcopic examination.  Pathology report pending.      Follow-up me in 3-4 weeks.  We will decide further management thereafter.

## 2024-06-10 LAB
ESTROGEN SERPL-MCNC: NORMAL PG/ML
INSULIN SERPL-ACNC: NORMAL U[IU]/ML
LAB AP CLINICAL INFORMATION: NORMAL
LAB AP GROSS DESCRIPTION: NORMAL
LAB AP LABORATORY NOTES: NORMAL
T3RU NFR SERPL: NORMAL %

## 2024-06-14 LAB
HPV 16: NEGATIVE
HPV 18: NEGATIVE
HPV OTHER: POSITIVE

## 2024-06-27 ENCOUNTER — OFFICE VISIT (OUTPATIENT)
Dept: OBSTETRICS AND GYNECOLOGY | Facility: CLINIC | Age: 34
End: 2024-06-27
Payer: COMMERCIAL

## 2024-06-27 VITALS
HEIGHT: 66 IN | BODY MASS INDEX: 47.09 KG/M2 | DIASTOLIC BLOOD PRESSURE: 88 MMHG | SYSTOLIC BLOOD PRESSURE: 144 MMHG | HEART RATE: 86 BPM | WEIGHT: 293 LBS

## 2024-06-27 DIAGNOSIS — R87.612 LGSIL ON PAP SMEAR OF CERVIX: Primary | ICD-10-CM

## 2024-06-27 PROCEDURE — 99214 OFFICE O/P EST MOD 30 MIN: CPT | Mod: PBBFAC | Performed by: OBSTETRICS & GYNECOLOGY

## 2024-06-27 PROCEDURE — 99999 PR PBB SHADOW E&M-EST. PATIENT-LVL IV: CPT | Mod: PBBFAC,,, | Performed by: OBSTETRICS & GYNECOLOGY

## 2024-06-27 PROCEDURE — 99499 UNLISTED E&M SERVICE: CPT | Mod: S$PBB,,, | Performed by: OBSTETRICS & GYNECOLOGY

## 2024-06-27 NOTE — PATIENT INSTRUCTIONS
We discussed proceeding with cryo surgery.      In addition I have recommended Gardasil vaccination series.    We will give 1st vaccination with cryo surgery.    Schedule cryo surgery in 2-3 weeks.

## 2024-06-27 NOTE — PROGRESS NOTES
Subjective:       Patient ID: Norma Blue is a 34 y.o. female.    Chief Complaint: Follow-up (3 weeks follow up )    Presents for discussion only.  Follow-up of colposcopic exam and biopsies.    Previous Pap revealed low-grade intraepithelial lesion.      Follow-up colposcopic exam was performed.  Questionable dysplasia was noted anterior lip of cervix.  No abnormality noted within the endocervical canal.      ECC returned negative.  Cervical biopsies revealed moderate dysplasia.      She has not sexually active at present.      Review of Systems      Objective:      Physical Exam    Assessment:       1. LGSIL on Pap smear of cervix        Plan:       Patient Instructions   We discussed proceeding with cryo surgery.      In addition I have recommended Gardasil vaccination series.    We will give 1st vaccination with cryo surgery.    Schedule cryo surgery in 2-3 weeks.       Resume Lipitor with patient tolerating PO

## 2024-07-01 ENCOUNTER — PATIENT OUTREACH (OUTPATIENT)
Facility: HOSPITAL | Age: 34
End: 2024-07-01
Payer: COMMERCIAL

## 2024-07-01 NOTE — PROGRESS NOTES
Population Health Chart Review & Patient Outreach Details    Updates Requested / Reviewed:  [x]  Care Team Updated    Health Maintenance Topics Addressed and Outreach Outcomes / Actions Taken:  Diabetic Eye Exam [x] HM Updated with June 2024 Eye Exam (Dr. Toledo). History Updated.

## 2024-07-15 ENCOUNTER — PROCEDURE VISIT (OUTPATIENT)
Dept: OBSTETRICS AND GYNECOLOGY | Facility: CLINIC | Age: 34
End: 2024-07-15
Payer: COMMERCIAL

## 2024-07-15 VITALS — DIASTOLIC BLOOD PRESSURE: 80 MMHG | SYSTOLIC BLOOD PRESSURE: 132 MMHG

## 2024-07-15 DIAGNOSIS — N87.9 CERVICAL DYSPLASIA: Primary | ICD-10-CM

## 2024-07-15 PROCEDURE — 57511 CRYOCAUTERY OF CERVIX: CPT | Mod: PBBFAC | Performed by: OBSTETRICS & GYNECOLOGY

## 2024-07-15 PROCEDURE — 99999PBSHW HPV VACCINE 9-VALENT 3 DOSE IM: Mod: PBBFAC,,,

## 2024-07-15 PROCEDURE — 99499 UNLISTED E&M SERVICE: CPT | Mod: S$PBB,,, | Performed by: OBSTETRICS & GYNECOLOGY

## 2024-07-15 PROCEDURE — 57511 CRYOCAUTERY OF CERVIX: CPT | Mod: S$PBB,,, | Performed by: OBSTETRICS & GYNECOLOGY

## 2024-07-15 PROCEDURE — 90471 IMMUNIZATION ADMIN: CPT | Mod: PBBFAC | Performed by: OBSTETRICS & GYNECOLOGY

## 2024-07-15 NOTE — LETTER
July 15, 2024    Norma Blue  [address] [place] [address] [place] [address]             [facility]  Obstetrics and Gynecology  [address] [place] [address] [place] [address]  Phone: [phone]  Fax: [phone]   July 15, 2024     Patient: Norma Blue   YOB: 1990   Date of Visit: 7/15/2024       To Whom it May Concern:    Norma Blue was seen in my clinic on 7/15/2024. She may return to work on 7/16/2024 .    Please excuse her from any  work missed.    If you have any questions or concerns, please don't hesitate to call.    Sincerely,         Gigi Lucas MD

## 2024-07-15 NOTE — PROCEDURES
Procedures    Presents for cryo surgery.      Previous Pap revealed low-grade dysplasia.  In addition HPV other was positive     Follow-up colposcopic exam was performed.  ECC was negative.  However cervical biopsies revealed moderate dysplasia.      She was brought back today for cryo surgery.      Cervix was visualized with speculum.  Thereafter cryo surgery was performed double freeze technique performed.  Excellent freezing noted.

## 2024-07-15 NOTE — PATIENT INSTRUCTIONS
Discussed cryo surgery done today.    She understands she will need repeat Pap in 1 year.  She was referred to me by Dr. Mccoy yet she will return to Dr. Min for Pap testing.  She understands I will be retiring in November.  If any further abnormality she should see another gyn provider.      She received 1st Gardasil today.  She will continue series every 2 months until 3 doses    Follow-up in 2 months for 2nd Gardasil.    Discussed condoms if sexual activity occurs

## 2025-01-23 ENCOUNTER — OFFICE VISIT (OUTPATIENT)
Dept: FAMILY MEDICINE | Facility: CLINIC | Age: 35
End: 2025-01-23
Payer: COMMERCIAL

## 2025-01-23 VITALS
SYSTOLIC BLOOD PRESSURE: 158 MMHG | TEMPERATURE: 100 F | OXYGEN SATURATION: 97 % | DIASTOLIC BLOOD PRESSURE: 104 MMHG | WEIGHT: 293 LBS | HEIGHT: 66 IN | RESPIRATION RATE: 18 BRPM | HEART RATE: 82 BPM | BODY MASS INDEX: 47.09 KG/M2

## 2025-01-23 DIAGNOSIS — E11.65 TYPE 2 DIABETES MELLITUS WITH HYPERGLYCEMIA, WITHOUT LONG-TERM CURRENT USE OF INSULIN: ICD-10-CM

## 2025-01-23 DIAGNOSIS — I10 ESSENTIAL HYPERTENSION: ICD-10-CM

## 2025-01-23 LAB
ALBUMIN SERPL BCP-MCNC: 3 G/DL (ref 3.5–5)
ALBUMIN/GLOB SERPL: 0.5 {RATIO}
ALP SERPL-CCNC: 59 U/L (ref 40–150)
ALT SERPL W P-5'-P-CCNC: 12 U/L
ANION GAP SERPL CALCULATED.3IONS-SCNC: 11 MMOL/L (ref 7–16)
AST SERPL W P-5'-P-CCNC: 18 U/L (ref 5–34)
BASOPHILS # BLD AUTO: 0.04 K/UL (ref 0–0.2)
BASOPHILS NFR BLD AUTO: 0.4 % (ref 0–1)
BILIRUB SERPL-MCNC: 0.2 MG/DL
BUN SERPL-MCNC: 11 MG/DL (ref 7–19)
BUN/CREAT SERPL: 13 (ref 6–20)
CALCIUM SERPL-MCNC: 9.2 MG/DL (ref 8.4–10.2)
CHLORIDE SERPL-SCNC: 100 MMOL/L (ref 98–107)
CHOLEST SERPL-MCNC: 210 MG/DL
CHOLEST/HDLC SERPL: 6.2 {RATIO}
CO2 SERPL-SCNC: 27 MMOL/L (ref 22–29)
CREAT SERPL-MCNC: 0.88 MG/DL (ref 0.55–1.02)
CREAT UR-MCNC: 252 MG/DL (ref 15–325)
DIFFERENTIAL METHOD BLD: ABNORMAL
EGFR (NO RACE VARIABLE) (RUSH/TITUS): 90 ML/MIN/1.73M2
EOSINOPHIL # BLD AUTO: 0.05 K/UL (ref 0–0.5)
EOSINOPHIL NFR BLD AUTO: 0.5 % (ref 1–4)
ERYTHROCYTE [DISTWIDTH] IN BLOOD BY AUTOMATED COUNT: 12.5 % (ref 11.5–14.5)
EST. AVERAGE GLUCOSE BLD GHB EST-MCNC: 295 MG/DL
GLOBULIN SER-MCNC: 5.8 G/DL (ref 2–4)
GLUCOSE SERPL-MCNC: 252 MG/DL (ref 74–100)
HBA1C MFR BLD HPLC: 11.9 %
HCT VFR BLD AUTO: 43.3 % (ref 38–47)
HDLC SERPL-MCNC: 34 MG/DL (ref 35–60)
HGB BLD-MCNC: 14.3 G/DL (ref 12–16)
IMM GRANULOCYTES # BLD AUTO: 0.03 K/UL (ref 0–0.04)
IMM GRANULOCYTES NFR BLD: 0.3 % (ref 0–0.4)
LDLC SERPL CALC-MCNC: 109 MG/DL
LDLC/HDLC SERPL: 3.2 {RATIO}
LYMPHOCYTES # BLD AUTO: 4.02 K/UL (ref 1–4.8)
LYMPHOCYTES NFR BLD AUTO: 42.6 % (ref 27–41)
MCH RBC QN AUTO: 29.2 PG (ref 27–31)
MCHC RBC AUTO-ENTMCNC: 33 G/DL (ref 32–36)
MCV RBC AUTO: 88.5 FL (ref 80–96)
MICROALBUMIN UR-MCNC: >500 MG/DL
MICROALBUMIN/CREAT RATIO PNL UR: ABNORMAL
MONOCYTES # BLD AUTO: 0.58 K/UL (ref 0–0.8)
MONOCYTES NFR BLD AUTO: 6.2 % (ref 2–6)
MPC BLD CALC-MCNC: 10.6 FL (ref 9.4–12.4)
NEUTROPHILS # BLD AUTO: 4.71 K/UL (ref 1.8–7.7)
NEUTROPHILS NFR BLD AUTO: 50 % (ref 53–65)
NONHDLC SERPL-MCNC: 176 MG/DL
NRBC # BLD AUTO: 0 X10E3/UL
NRBC, AUTO (.00): 0 %
PLATELET # BLD AUTO: 396 K/UL (ref 150–400)
POTASSIUM SERPL-SCNC: 4 MMOL/L (ref 3.5–5.1)
PROT SERPL-MCNC: 8.8 G/DL (ref 6.4–8.3)
RBC # BLD AUTO: 4.89 M/UL (ref 4.2–5.4)
SODIUM SERPL-SCNC: 134 MMOL/L (ref 136–145)
TRIGL SERPL-MCNC: 334 MG/DL (ref 37–140)
VLDLC SERPL-MCNC: 67 MG/DL
WBC # BLD AUTO: 9.43 K/UL (ref 4.5–11)

## 2025-01-23 PROCEDURE — 82570 ASSAY OF URINE CREATININE: CPT | Mod: ,,, | Performed by: CLINICAL MEDICAL LABORATORY

## 2025-01-23 PROCEDURE — 85025 COMPLETE CBC W/AUTO DIFF WBC: CPT | Mod: ,,, | Performed by: CLINICAL MEDICAL LABORATORY

## 2025-01-23 PROCEDURE — 82043 UR ALBUMIN QUANTITATIVE: CPT | Mod: ,,, | Performed by: CLINICAL MEDICAL LABORATORY

## 2025-01-23 PROCEDURE — 80053 COMPREHEN METABOLIC PANEL: CPT | Mod: ,,, | Performed by: CLINICAL MEDICAL LABORATORY

## 2025-01-23 PROCEDURE — 99214 OFFICE O/P EST MOD 30 MIN: CPT | Mod: ,,, | Performed by: FAMILY MEDICINE

## 2025-01-23 PROCEDURE — 83036 HEMOGLOBIN GLYCOSYLATED A1C: CPT | Mod: ,,, | Performed by: CLINICAL MEDICAL LABORATORY

## 2025-01-23 PROCEDURE — 80061 LIPID PANEL: CPT | Mod: ,,, | Performed by: CLINICAL MEDICAL LABORATORY

## 2025-01-23 RX ORDER — METFORMIN HYDROCHLORIDE 500 MG/1
500 TABLET ORAL 2 TIMES DAILY WITH MEALS
Qty: 60 TABLET | Refills: 5 | Status: SHIPPED | OUTPATIENT
Start: 2025-01-23

## 2025-01-23 RX ORDER — LISINOPRIL 40 MG/1
40 TABLET ORAL DAILY
Qty: 30 TABLET | Refills: 5 | Status: SHIPPED | OUTPATIENT
Start: 2025-01-23 | End: 2026-01-23

## 2025-01-23 RX ORDER — HYDROCHLOROTHIAZIDE 25 MG/1
25 TABLET ORAL DAILY
Qty: 30 TABLET | Refills: 5 | Status: SHIPPED | OUTPATIENT
Start: 2025-01-23 | End: 2026-01-23

## 2025-01-23 RX ORDER — NAPROXEN 500 MG/1
500 TABLET ORAL 2 TIMES DAILY
Qty: 60 TABLET | Refills: 2 | Status: SHIPPED | OUTPATIENT
Start: 2025-01-23

## 2025-01-23 RX ORDER — SEMAGLUTIDE 0.68 MG/ML
0.5 INJECTION, SOLUTION SUBCUTANEOUS
Qty: 1 EACH | Refills: 1 | Status: SHIPPED | OUTPATIENT
Start: 2025-01-23

## 2025-01-23 RX ORDER — LANCETS 33 GAUGE
1 EACH MISCELLANEOUS DAILY
Qty: 100 EACH | Refills: 3 | Status: SHIPPED | OUTPATIENT
Start: 2025-01-23

## 2025-01-23 RX ORDER — LANCETS
1 EACH MISCELLANEOUS DAILY
Qty: 100 EACH | Refills: 2 | Status: SHIPPED | OUTPATIENT
Start: 2025-01-23

## 2025-01-23 NOTE — PROGRESS NOTES
New Clinic Note    Norma Blue is a 34 y.o. female     CC:   Chief Complaint   Patient presents with    Diabetes     Patient is here for 6 month check up, renewal of prescriptions sent to [facility] and is not fasting for labs. Needs repeat A1C, diabetic foot exam, and Hep C screening. Works at [employer] now. Ran out of her medication     Hypertension    Medication Refill        Subjective    History of Present Illness   History of Present Illness    CHIEF COMPLAINT:  Patient presents today for medication refill after being out of medications for several months.    MEDICATION COMPLIANCE:  She has been out of medications for several months due to work schedule conflicts leading to missed and cancelled appointments.    RECENT EMERGENCY ROOM VISIT:  She recently visited the emergency room where she was diagnosed with a viral infection. During the visit, elevated blood pressure was noted and glucose was approximately 300.    DIABETES:  She performs home glucose monitoring with recent readings in the 200s. She experiences frequent urination when blood sugar is elevated and can recognize symptoms of hypoglycemia but has difficulty identifying hyperglycemia.      IMMUNIZATIONS:  She has not received a flu vaccine recently and declines administration at this visit.      ROS:  General: -fever, -chills, -fatigue, -weight gain, -weight loss  Eyes: -vision changes, -redness, -discharge  ENT: -ear pain, -nasal congestion, -sore throat, +hearing loss  Cardiovascular: -chest pain, -palpitations, -lower extremity edema  Respiratory: -cough, -shortness of breath  Gastrointestinal: -abdominal pain, -nausea, -vomiting, -diarrhea, -constipation, -blood in stool  Genitourinary: -dysuria, -hematuria, -frequency  Musculoskeletal: -joint pain, -muscle pain  Skin: -rash, -lesion  Neurological: -headache, -dizziness, -numbness, -tingling  Psychiatric: -anxiety, -depression, -sleep difficulty  Endocrine: +excessive urination       "      Current Outpatient Medications:     ACCU-CHEK GUIDE GLUCOSE METER [place], USE 1 ONCE DAILY DUE TO DIABETES, Disp: , Rfl:     ACCU-CHEK SOFTCLIX LANCETS Misc, 1 Lancet by local intranasal application route Daily., Disp: 100 each, Rfl: 2    blood sugar diagnostic Strp, 1 strip by Misc.(Non-Drug; Combo Route) route once daily., Disp: 100 strip, Rfl: 3    hydroCHLOROthiazide (HYDRODIURIL) 25 MG tablet, Take 1 tablet (25 mg total) by mouth once daily., Disp: 30 tablet, Rfl: 5    lancets (TRUEPLUS LANCETS) 33 gauge Misc, 1 lancet  by Misc.(Non-Drug; Combo Route) route once daily., Disp: 100 each, Rfl: 3    lisinopriL (PRINIVIL,ZESTRIL) 40 MG tablet, Take 1 tablet (40 mg total) by mouth once daily., Disp: 30 tablet, Rfl: 5    metFORMIN (GLUCOPHAGE) 500 MG tablet, Take 1 tablet (500 mg total) by mouth 2 (two) times daily with meals., Disp: 60 tablet, Rfl: 5    naproxen (NAPROSYN) 500 MG tablet, Take 1 tablet (500 mg total) by mouth 2 (two) times daily., Disp: 60 tablet, Rfl: 2    semaglutide (OZEMPIC) 0.25 mg or 0.5 mg (2 mg/3 mL) pen injector, Inject 0.5 mg into the skin every 7 days., Disp: 1 each, Rfl: 1     Past Medical History:   Diagnosis Date    Diabetes mellitus, type 2     Diabetic eye exam 06/07/2024    Dr. [facility]    Hypertension         Family History   Problem Relation Name Age of Onset    Hypertension Mother      Diabetes Father      Cancer Maternal Grandmother      Hypertension Maternal Grandmother          Past Surgical History:   Procedure Laterality Date    spleenectomy          BP (!) 158/104 (BP Location: Left arm, Patient Position: Sitting)   Pulse 82   Temp 99.6 °F (37.6 °C) (Oral)   Resp 18   Ht 5' 6" (1.676 m)   Wt (!) 162.4 kg (358 lb)   LMP 01/22/2025   SpO2 97%   BMI 57.78 kg/m²      Physical Exam  HENT:      Head: Normocephalic and atraumatic.      Mouth/Throat:      Pharynx: Oropharynx is clear.   Cardiovascular:      Rate and Rhythm: Normal rate and regular " rhythm.   Pulmonary:      Effort: Pulmonary effort is normal.      Breath sounds: Normal breath sounds.   Neurological:      Mental Status: She is alert and oriented to person, place, and time.   Psychiatric:         Mood and Affect: Mood normal.         Behavior: Behavior normal.          Assessment and Plan    Assessment & Plan    IMPRESSION:  - Assessed patient's condition after extended period without medication  - Evaluated blood pressure, noting it was elevated  - Considered blood sugar, which were reported as elevated (around 300)  - Determined need to restart medications and closely monitor response    DIABETES:  - Educated the patient about the correlation between elevated glucose and frequent urination, as well as its impact on recovery from illnesses.  - Restarted all previous diabetes medications.  - Ordered labs to assess glycemic control.  - Scheduled a follow-up visit in 3 months to reevaluate glucose levels.  - Noted that the patient's glucose was approximately 300 mg/dL during the emergency room visit, with recent self-monitored levels around 200 mg/dL.      HYPERTENSION:  - Restarted all previous antihypertensive medications.  - Ordered labs to assess blood pressure control.  - Scheduled a follow-up visit in 2 weeks to reassess blood pressure and evaluate medication efficacy.  - Noted that patient reports high blood pressure during the emergency room visit.    SPLENECTOMY:  - Noted the patient's history of splenectomy.    MEDICATION MANAGEMENT:  - Emphasized the importance of medication adherence to the patient.   - Restarted all prescribed medications.  - Scheduled follow-up visits to monitor medication compliance.  - Noted that the patient had been without medication for several months due to inability to attend appointments.          Problem List Items Addressed This Visit       Type 2 diabetes mellitus with hyperglycemia, without long-term current use of insulin    Relevant Medications     metFORMIN (GLUCOPHAGE) 500 MG tablet    semaglutide (OZEMPIC) 0.25 mg or 0.5 mg (2 mg/3 mL) pen injector    lancets (TRUEPLUS LANCETS) 33 gauge Misc    blood sugar diagnostic Strp    Other Relevant Orders    Hemoglobin A1C    Microalbumin/Creatinine Ratio, Urine    Essential hypertension    Relevant Medications    hydroCHLOROthiazide (HYDRODIURIL) 25 MG tablet    lisinopriL (PRINIVIL,ZESTRIL) 40 MG tablet    Other Relevant Orders    Comprehensive Metabolic Panel    CBC Auto Differential    Lipid Panel        Orders Placed This Encounter   Procedures    Hemoglobin A1C     Standing Status:   Future     Number of Occurrences:   1     Standing Expiration Date:   3/24/2026    Microalbumin/Creatinine Ratio, Urine     Order Specific Question:   Specimen Source     Answer:   Urine    Comprehensive Metabolic Panel     Standing Status:   Future     Number of Occurrences:   1     Standing Expiration Date:   3/24/2026    CBC Auto Differential     Standing Status:   Future     Number of Occurrences:   1     Standing Expiration Date:   3/24/2026    Lipid Panel     Standing Status:   Future     Number of Occurrences:   1     Standing Expiration Date:   3/24/2026    CBC with Differential        Follow up in about 3 months (around 4/23/2025).     This note was generated with the assistance of ambient listening technology. Verbal consent was obtained by the patient and accompanying visitor(s) for the recording of patient appointment to facilitate this note. I attest to having reviewed and edited the generated note for accuracy, though some syntax or spelling errors may persist. Please contact the author of this note for any clarification.

## 2025-01-27 DIAGNOSIS — E78.2 MIXED HYPERLIPIDEMIA: Primary | ICD-10-CM

## 2025-01-27 NOTE — PROGRESS NOTES
A1C is too high but she has been out of her medications. Take meds and repeat in 3 months. Cholesterol is too high. Atorvastatin 10mg poqd. Repeat in 3 months.

## 2025-01-27 NOTE — TELEPHONE ENCOUNTER
----- Message from Jocelynn Carolina MD sent at 1/27/2025 11:17 AM CST -----  A1C is too high but she has been out of her medications. Take meds and repeat in 3 months. Cholesterol is too high. Atorvastatin 10mg poqd. Repeat in 3 months.

## 2025-01-28 RX ORDER — ATORVASTATIN CALCIUM 10 MG/1
10 TABLET, FILM COATED ORAL DAILY
Qty: 90 TABLET | Refills: 3 | Status: SHIPPED | OUTPATIENT
Start: 2025-01-28 | End: 2026-01-28

## 2025-02-06 ENCOUNTER — OFFICE VISIT (OUTPATIENT)
Dept: FAMILY MEDICINE | Facility: CLINIC | Age: 35
End: 2025-02-06
Payer: COMMERCIAL

## 2025-02-06 VITALS
WEIGHT: 293 LBS | HEIGHT: 66 IN | BODY MASS INDEX: 47.09 KG/M2 | TEMPERATURE: 98 F | SYSTOLIC BLOOD PRESSURE: 154 MMHG | HEART RATE: 82 BPM | RESPIRATION RATE: 18 BRPM | OXYGEN SATURATION: 99 % | DIASTOLIC BLOOD PRESSURE: 102 MMHG

## 2025-02-06 DIAGNOSIS — I10 ESSENTIAL HYPERTENSION: Primary | ICD-10-CM

## 2025-02-06 DIAGNOSIS — R51.9 ACUTE NONINTRACTABLE HEADACHE, UNSPECIFIED HEADACHE TYPE: ICD-10-CM

## 2025-02-06 PROCEDURE — 4010F ACE/ARB THERAPY RXD/TAKEN: CPT | Mod: CPTII,,, | Performed by: FAMILY MEDICINE

## 2025-02-06 PROCEDURE — 96372 THER/PROPH/DIAG INJ SC/IM: CPT | Mod: ,,, | Performed by: FAMILY MEDICINE

## 2025-02-06 PROCEDURE — 1160F RVW MEDS BY RX/DR IN RCRD: CPT | Mod: CPTII,,, | Performed by: FAMILY MEDICINE

## 2025-02-06 PROCEDURE — 3046F HEMOGLOBIN A1C LEVEL >9.0%: CPT | Mod: CPTII,,, | Performed by: FAMILY MEDICINE

## 2025-02-06 PROCEDURE — 3080F DIAST BP >= 90 MM HG: CPT | Mod: CPTII,,, | Performed by: FAMILY MEDICINE

## 2025-02-06 PROCEDURE — 99214 OFFICE O/P EST MOD 30 MIN: CPT | Mod: 25,,, | Performed by: FAMILY MEDICINE

## 2025-02-06 PROCEDURE — 1159F MED LIST DOCD IN RCRD: CPT | Mod: CPTII,,, | Performed by: FAMILY MEDICINE

## 2025-02-06 PROCEDURE — 3077F SYST BP >= 140 MM HG: CPT | Mod: CPTII,,, | Performed by: FAMILY MEDICINE

## 2025-02-06 PROCEDURE — 3008F BODY MASS INDEX DOCD: CPT | Mod: CPTII,,, | Performed by: FAMILY MEDICINE

## 2025-02-06 RX ORDER — AMLODIPINE BESYLATE 5 MG/1
5 TABLET ORAL DAILY
Qty: 30 TABLET | Refills: 5 | Status: SHIPPED | OUTPATIENT
Start: 2025-02-06 | End: 2025-02-20 | Stop reason: DRUGHIGH

## 2025-02-06 RX ORDER — KETOROLAC TROMETHAMINE 30 MG/ML
60 INJECTION, SOLUTION INTRAMUSCULAR; INTRAVENOUS
Status: COMPLETED | OUTPATIENT
Start: 2025-02-06 | End: 2025-02-06

## 2025-02-06 RX ADMIN — KETOROLAC TROMETHAMINE 60 MG: 30 INJECTION, SOLUTION INTRAMUSCULAR; INTRAVENOUS at 10:02

## 2025-02-06 NOTE — LETTER
February 6, 2025      [facility] [address] [place] [address]  Phone: [phone]  Fax: [phone]       Patient: Norma Blue   YOB: 1990  Date of Visit: 02/06/2025    To Whom It May Concern:    Elizabeth Blue  was at [facility] on 02/06/2025. The patient may return to work/school on 02/06/2025 with no restrictions. If you have any questions or concerns, or if I can be of further assistance, please do not hesitate to contact me.    Sincerely,    Barbara Holland RN/Dr Jocelynn Carolina

## 2025-02-06 NOTE — PROGRESS NOTES
New Clinic Note    Norma Blue is a 34 y.o. female     CC:   Chief Complaint   Patient presents with    Hypertension     Patient is here for 2 week follow up on elevated blood pressure. She has a little headache this am. No chest pain, SOB or swelling of feet or legs. Mother has HTN and recently had a stroke. Patient is on HCTZ and Lisinopril.     Follow-up        Subjective    History of Present Illness HPI   Patient is here for a 2 week follow up on blood pressure. She is tolerating her current meds. Her blood pressure is still elevated. She complains of a headache.    Current Outpatient Medications:     ACCU-CHEK GUIDE GLUCOSE METER [facility], USE 1 ONCE DAILY DUE TO DIABETES, Disp: , Rfl:     ACCU-CHEK SOFTCLIX LANCETS [facility], 1 Lancet by local intranasal application route Daily., Disp: 100 each, Rfl: 2    atorvastatin (LIPITOR) 10 MG tablet, Take 1 tablet (10 mg total) by mouth once daily., Disp: 90 tablet, Rfl: 3    blood sugar diagnostic Strp, 1 strip by Misc.(Non-Drug; Combo Route) route once daily., Disp: 100 strip, Rfl: 3    hydroCHLOROthiazide (HYDRODIURIL) 25 MG tablet, Take 1 tablet (25 mg total) by mouth once daily., Disp: 30 tablet, Rfl: 5    lancets (TRUEPLUS LANCETS) 33 gauge Misc, 1 lancet  by Misc.(Non-Drug; Combo Route) route once daily., Disp: 100 each, Rfl: 3    lisinopriL (PRINIVIL,ZESTRIL) 40 MG tablet, Take 1 tablet (40 mg total) by mouth once daily., Disp: 30 tablet, Rfl: 5    metFORMIN (GLUCOPHAGE) 500 MG tablet, Take 1 tablet (500 mg total) by mouth 2 (two) times daily with meals., Disp: 60 tablet, Rfl: 5    semaglutide (OZEMPIC) 0.25 mg or 0.5 mg (2 mg/3 mL) pen injector, Inject 0.5 mg into the skin every 7 days., Disp: 1 each, Rfl: 1    amLODIPine (NORVASC) 5 MG tablet, Take 1 tablet (5 mg total) by mouth once daily., Disp: 30 tablet, Rfl: 5  No current facility-administered medications for this visit.     Past Medical History:   Diagnosis Date    Diabetes mellitus, type 2     Diabetic  "eye exam 06/07/2024    Dr. Sreedhar Toledo [facility]    Hypertension         Family History   Problem Relation Name Age of Onset    Hypertension Mother      Diabetes Father      Cancer Maternal Grandmother      Hypertension Maternal Grandmother          Past Surgical History:   Procedure Laterality Date    spleenectomy          Review of Systems   Constitutional:  Negative for fatigue and fever.   HENT:  Negative for ear pain, postnasal drip, rhinorrhea and sinus pressure/congestion.    Respiratory:  Negative for cough and shortness of breath.    Cardiovascular:  Negative for chest pain.   Gastrointestinal:  Negative for abdominal pain, diarrhea, nausea and vomiting.   Genitourinary:  Negative for dysuria.   Neurological:  Positive for headaches.        BP (!) 154/102 (BP Location: Left arm, Patient Position: Sitting)   Pulse 82   Temp 98.4 °F (36.9 °C) (Oral)   Resp 18   Ht 5' 6" (1.676 m)   Wt (!) 163.3 kg (360 lb)   LMP 01/22/2025   SpO2 99%   BMI 58.11 kg/m²      Physical Exam  HENT:      Head: Normocephalic and atraumatic.   Cardiovascular:      Rate and Rhythm: Normal rate and regular rhythm.   Pulmonary:      Effort: Pulmonary effort is normal.      Breath sounds: Normal breath sounds.   Neurological:      Mental Status: She is alert and oriented to person, place, and time.   Psychiatric:         Mood and Affect: Mood normal.         Behavior: Behavior normal.          Assessment and Plan      ICD-10-CM ICD-9-CM   1. Essential hypertension  I10 401.9   2. Acute nonintractable headache, unspecified headache type  R51.9 784.0        1. Essential hypertension  Not controlled. Add amlodipine 5mg. Follow up in 2 weeks with a blood pressure log.   -     amLODIPine (NORVASC) 5 MG tablet; Take 1 tablet (5 mg total) by mouth once daily.  Dispense: 30 tablet; Refill: 5    2. Acute nonintractable headache, unspecified headache type  -     ketorolac injection 60 mg         Follow up in about 2 weeks " (around 2/20/2025).

## 2025-02-20 ENCOUNTER — OFFICE VISIT (OUTPATIENT)
Dept: FAMILY MEDICINE | Facility: CLINIC | Age: 35
End: 2025-02-20
Payer: COMMERCIAL

## 2025-02-20 VITALS
BODY MASS INDEX: 47.09 KG/M2 | HEART RATE: 84 BPM | DIASTOLIC BLOOD PRESSURE: 86 MMHG | HEIGHT: 66 IN | WEIGHT: 293 LBS | TEMPERATURE: 97 F | SYSTOLIC BLOOD PRESSURE: 144 MMHG | OXYGEN SATURATION: 100 % | RESPIRATION RATE: 18 BRPM

## 2025-02-20 DIAGNOSIS — I10 ESSENTIAL HYPERTENSION: Primary | ICD-10-CM

## 2025-02-20 RX ORDER — AMLODIPINE BESYLATE 10 MG/1
10 TABLET ORAL DAILY
Qty: 90 TABLET | Refills: 3 | Status: SHIPPED | OUTPATIENT
Start: 2025-02-20 | End: 2026-02-20

## 2025-02-20 NOTE — PROGRESS NOTES
"New Clinic Note    Norma Blue is a 34 y.o. female     CC:   Chief Complaint   Patient presents with    Hypertension     Patient is here for 2 week follow up for elevated blood pressure. Added Amlodipine to her medication list last visit. No CP or SOB. Has BMI of 58.11    Follow-up    Diabetes     Last A1C was 11.9.     Hyperlipidemia     On Atorvastatin 10 mg daily for elevated LIPID.         Subjective    History of Present Illness  Patient is here for a follow up on her blood pressure. She is tolerating her blood pressure medication well. Blood pressure is still elevated.     Current Medications[1]     Past Medical History:   Diagnosis Date    Diabetes mellitus, type 2     Diabetic eye exam 06/07/2024    Dr. Eli [facility]    Hypertension         Family History   Problem Relation Name Age of Onset    Hypertension Mother      Diabetes Father      Cancer Maternal Grandmother      Hypertension Maternal Grandmother          Past Surgical History:   Procedure Laterality Date    spleenectomy          Review of Systems   Constitutional:  Negative for fatigue and fever.   HENT:  Negative for ear pain, postnasal drip, rhinorrhea and sinus pressure/congestion.    Respiratory:  Negative for cough and shortness of breath.    Cardiovascular:  Negative for chest pain.   Gastrointestinal:  Negative for abdominal pain, diarrhea, nausea and vomiting.   Genitourinary:  Negative for dysuria.   Neurological:  Negative for headaches.        BP (!) 144/86 (BP Location: Left arm, Patient Position: Sitting)   Pulse 84   Temp 97.3 °F (36.3 °C) (Oral)   Resp 18   Ht 5' 6" (1.676 m)   Wt (!) 163.7 kg (361 lb)   LMP 01/22/2025   SpO2 100%   BMI 58.27 kg/m²      Physical Exam  HENT:      Head: Normocephalic and atraumatic.   Cardiovascular:      Rate and Rhythm: Normal rate and regular rhythm.   Pulmonary:      Effort: Pulmonary effort is normal.      Breath sounds: Normal breath sounds.   Neurological:      " Mental Status: She is alert and oriented to person, place, and time.   Psychiatric:         Mood and Affect: Mood normal.         Behavior: Behavior normal.          Assessment and Plan      ICD-10-CM ICD-9-CM   1. Essential hypertension  I10 401.9   2. BMI 50.0-59.9, adult  Z68.43 V85.43        1. Essential hypertension  Not controlled. Increase amlodipine to 10 mg. Continue other blood pressure meds. Recheck at next visit.   -     amLODIPine (NORVASC) 10 MG tablet; Take 1 tablet (10 mg total) by mouth once daily.  Dispense: 90 tablet; Refill: 3    2. BMI 50.0-59.9, adult  Diet and educational handouts given.      Follow up in about 4 weeks (around 3/20/2025).            [1]   Current Outpatient Medications:     ACCU-CHEK GUIDE GLUCOSE METER [facility], USE 1 ONCE DAILY DUE TO DIABETES, Disp: , Rfl:     ACCU-CHEK SOFTCLIX LANCETS [facility], 1 Lancet by local intranasal application route Daily., Disp: 100 each, Rfl: 2    atorvastatin (LIPITOR) 10 MG tablet, Take 1 tablet (10 mg total) by mouth once daily., Disp: 90 tablet, Rfl: 3    blood sugar diagnostic Strp, 1 strip by Misc.(Non-Drug; Combo Route) route once daily., Disp: 100 strip, Rfl: 3    hydroCHLOROthiazide (HYDRODIURIL) 25 MG tablet, Take 1 tablet (25 mg total) by mouth once daily., Disp: 30 tablet, Rfl: 5    lancets (TRUEPLUS LANCETS) 33 gauge Misc, 1 lancet  by Misc.(Non-Drug; Combo Route) route once daily., Disp: 100 each, Rfl: 3    lisinopriL (PRINIVIL,ZESTRIL) 40 MG tablet, Take 1 tablet (40 mg total) by mouth once daily., Disp: 30 tablet, Rfl: 5    metFORMIN (GLUCOPHAGE) 500 MG tablet, Take 1 tablet (500 mg total) by mouth 2 (two) times daily with meals., Disp: 60 tablet, Rfl: 5    semaglutide (OZEMPIC) 0.25 mg or 0.5 mg (2 mg/3 mL) pen injector, Inject 0.5 mg into the skin every 7 days., Disp: 1 each, Rfl: 1    amLODIPine (NORVASC) 10 MG tablet, Take 1 tablet (10 mg total) by mouth once daily., Disp: 90 tablet, Rfl: 3

## 2025-04-03 ENCOUNTER — OFFICE VISIT (OUTPATIENT)
Dept: FAMILY MEDICINE | Facility: CLINIC | Age: 35
End: 2025-04-03
Payer: COMMERCIAL

## 2025-04-03 VITALS
RESPIRATION RATE: 20 BRPM | HEIGHT: 66 IN | TEMPERATURE: 99 F | WEIGHT: 293 LBS | OXYGEN SATURATION: 97 % | SYSTOLIC BLOOD PRESSURE: 168 MMHG | HEART RATE: 89 BPM | DIASTOLIC BLOOD PRESSURE: 105 MMHG | BODY MASS INDEX: 47.09 KG/M2

## 2025-04-03 DIAGNOSIS — Z87.42 HISTORY OF ABNORMAL CERVICAL PAP SMEAR: ICD-10-CM

## 2025-04-03 DIAGNOSIS — N92.6 MENSTRUAL IRREGULARITY: Primary | ICD-10-CM

## 2025-04-03 DIAGNOSIS — Z20.2 POSSIBLE EXPOSURE TO STD: ICD-10-CM

## 2025-04-03 LAB
BILIRUB SERPL-MCNC: NEGATIVE MG/DL
BLOOD URINE, POC: ABNORMAL
CLARITY, UA: ABNORMAL
COLOR, UA: YELLOW
GLUCOSE UR QL STRIP: ABNORMAL
HCV AB SER QL: ABNORMAL
HIV 1+O+2 AB SERPL QL: NORMAL
KETONES UR QL STRIP: NEGATIVE
LEUKOCYTE ESTERASE URINE, POC: NEGATIVE
NITRITE, POC UA: NEGATIVE
PH, POC UA: 6.5
PROTEIN, POC: ABNORMAL
SPECIFIC GRAVITY, POC UA: >=1.03
SYPHILIS AB INTERPRETATION: NORMAL
TSH SERPL DL<=0.005 MIU/L-ACNC: 2.55 UIU/ML (ref 0.35–4.94)
UROBILINOGEN, POC UA: ABNORMAL

## 2025-04-03 PROCEDURE — 87591 N.GONORRHOEAE DNA AMP PROB: CPT | Mod: ,,, | Performed by: CLINICAL MEDICAL LABORATORY

## 2025-04-03 PROCEDURE — 3046F HEMOGLOBIN A1C LEVEL >9.0%: CPT | Mod: CPTII,,, | Performed by: NURSE PRACTITIONER

## 2025-04-03 PROCEDURE — 1160F RVW MEDS BY RX/DR IN RCRD: CPT | Mod: CPTII,,, | Performed by: NURSE PRACTITIONER

## 2025-04-03 PROCEDURE — 4010F ACE/ARB THERAPY RXD/TAKEN: CPT | Mod: CPTII,,, | Performed by: NURSE PRACTITIONER

## 2025-04-03 PROCEDURE — 3080F DIAST BP >= 90 MM HG: CPT | Mod: CPTII,,, | Performed by: NURSE PRACTITIONER

## 2025-04-03 PROCEDURE — 3077F SYST BP >= 140 MM HG: CPT | Mod: CPTII,,, | Performed by: NURSE PRACTITIONER

## 2025-04-03 PROCEDURE — 1159F MED LIST DOCD IN RCRD: CPT | Mod: CPTII,,, | Performed by: NURSE PRACTITIONER

## 2025-04-03 PROCEDURE — 86803 HEPATITIS C AB TEST: CPT | Mod: ,,, | Performed by: CLINICAL MEDICAL LABORATORY

## 2025-04-03 PROCEDURE — 87491 CHLMYD TRACH DNA AMP PROBE: CPT | Mod: ,,, | Performed by: CLINICAL MEDICAL LABORATORY

## 2025-04-03 PROCEDURE — 3008F BODY MASS INDEX DOCD: CPT | Mod: CPTII,,, | Performed by: NURSE PRACTITIONER

## 2025-04-03 PROCEDURE — 99214 OFFICE O/P EST MOD 30 MIN: CPT | Mod: ,,, | Performed by: NURSE PRACTITIONER

## 2025-04-04 ENCOUNTER — RESULTS FOLLOW-UP (OUTPATIENT)
Dept: FAMILY MEDICINE | Facility: CLINIC | Age: 35
End: 2025-04-04

## 2025-04-04 LAB
CHLAMYDIA BY PCR: NEGATIVE
N. GONORRHOEAE (GC) BY PCR: NEGATIVE

## 2025-04-04 NOTE — PROGRESS NOTES
"   KRISTOPHER Welch   [facility]  [address]  [place] [address]     PATIENT NAME: Norma Blue  : 1990  DATE: 4/3/25  MRN: [id number]      Billing Provider: KRISTOPHER Welch  Level of Service: HI OFFICE/OUTPT VISIT, ESTJESSICA IV, 30-39 MIN  Patient PCP Information       Provider PCP Type    Jocelynn Carolina MD General            Reason for Visit / Chief Complaint: Health Maintenance   Health Maintenance Due   Topic Date Due    Hepatitis C Screening  Never done    HIV Screening  Never done    TETANUS VACCINE  Never done    COVID-19 Vaccine ( season) Never done    Hemoglobin A1c  2025    Diabetic Eye Exam  2025          Update PCP  Update Chief Complaint         History of Present Illness / Problem Focused Workflow     History of Present Illness    CHIEF COMPLAINT:  Patient presents today for follow up and lab work. Originally pt made appt due to "trouble conceiving", but on further investigation, pt states she is not sexually active right now but has been recently. She says she does have one partner she has been with consistently. She would like to be checked for stds as well.     SOCIAL HISTORY:  She reports increased stress due to being the sole provider while caring for both parents who have significant health issues. She is not currently sexually active.    FAMILY HISTORY:  Her mother recently had a stroke. Her father recently underwent a 14-hour surgery for anal cancer. She has three cousins with history of breast cancer, one of whom underwent partial mastectomy.    HYPERTENSION:  She is only taking amlodipine and has discontinued hydrochlorothiazide and lisinopril when informed about her high bp. Pt states "so I probably need to take those other two pills then". When asked which pill she was taking, she said the one that starts with the "A". She states she has just not been taking the other two bp meds. Instructed she needs to take all 3 of her bp meds. Pt vu. "     DIABETES:  She recognizes symptoms of hyperglycemia, reporting increased thirst when blood sugar is elevated. She continues Ozempic and Metformin for management. She reports itching on one foot.    GYNECOLOGICAL HISTORY:  She has history of abnormal pap smear last year with subsequent cervical freezing procedure performed by Dr. Lucas. She has history of fertility concerns. She denies current urinary symptoms, vaginal itching, or abnormal discharge.      ROS:  General: -fever, -chills, -fatigue, -weight gain, -weight loss  Eyes: -vision changes, -redness, -discharge  ENT: -ear pain, -nasal congestion, -sore throat  Cardiovascular: -chest pain, -palpitations, -lower extremity edema  Respiratory: -cough, -shortness of breath  Gastrointestinal: -abdominal pain, -nausea, -vomiting, -diarrhea, -constipation, -blood in stool  Genitourinary: -dysuria, -hematuria, -frequency  Musculoskeletal: -joint pain, -muscle pain  Skin: -rash, -lesion, +itching  Neurological: -headache, -dizziness, +numbness, +tingling  Psychiatric: -anxiety, -depression, -sleep difficulty  Endocrine: +excessive thirst          Hemoglobin A1C   Date Value Ref Range Status   01/23/2025 11.9 (H) <=7.0 % Final     Comment:       Normal:               <5.7%  Pre-Diabetic:       5.7% to 6.4%  Diabetic:             >6.4%  Diabetic Goal:     <7%   03/11/2024 9.1 (H) 4.5 - 6.6 % Final     Comment:       Normal:               <5.7%  Pre-Diabetic:       5.7% to 6.4%  Diabetic:             >6.4%  Diabetic Goal:     <7%   08/04/2023 5.7 4.5 - 6.6 % Final     Comment:       Normal:               <5.7%  Pre-Diabetic:       5.7% to 6.4%  Diabetic:             >6.4%  Diabetic Goal:     <7%        CMP  Sodium   Date Value Ref Range Status   01/23/2025 134 (L) 136 - 145 mmol/L Final     Potassium   Date Value Ref Range Status   01/23/2025 4.0 3.5 - 5.1 mmol/L Final     Chloride   Date Value Ref Range Status   01/23/2025 100 98 - 107 mmol/L Final     CO2   Date Value  Ref Range Status   01/23/2025 27 22 - 29 mmol/L Final     Glucose   Date Value Ref Range Status   01/23/2025 252 (H) 74 - 100 mg/dL Final     BUN   Date Value Ref Range Status   01/23/2025 11 7 - 19 mg/dL Final     Creatinine   Date Value Ref Range Status   01/23/2025 0.88 0.55 - 1.02 mg/dL Final     Calcium   Date Value Ref Range Status   01/23/2025 9.2 8.4 - 10.2 mg/dL Final     Total Protein   Date Value Ref Range Status   01/23/2025 8.8 (H) 6.4 - 8.3 g/dL Final     Albumin   Date Value Ref Range Status   01/23/2025 3.0 (L) 3.5 - 5.0 g/dL Final     Bilirubin, Total   Date Value Ref Range Status   01/23/2025 0.2 <=1.5 mg/dL Final     Alk Phos   Date Value Ref Range Status   01/23/2025 59 40 - 150 U/L Final     AST   Date Value Ref Range Status   01/23/2025 18 5 - 34 U/L Final     ALT   Date Value Ref Range Status   01/23/2025 12 <=55 U/L Final     Anion Gap   Date Value Ref Range Status   01/23/2025 11 7 - 16 mmol/L Final     eGFR   Date Value Ref Range Status   01/23/2025 90 >=60 mL/min/1.73m2 Final        Lab Results   Component Value Date    WBC 9.43 01/23/2025    RBC 4.89 01/23/2025    HGB 14.3 01/23/2025    HCT 43.3 01/23/2025    MCV 88.5 01/23/2025    MCH 29.2 01/23/2025    MCHC 33.0 01/23/2025    RDW 12.5 01/23/2025     01/23/2025    MPV 10.6 01/23/2025    LYMPH 42.6 (H) 01/23/2025    LYMPH 4.02 01/23/2025    MONO 6.2 (H) 01/23/2025    EOS 0.05 01/23/2025    BASO 0.04 01/23/2025    EOSINOPHIL 0.5 (L) 01/23/2025    BASOPHIL 0.4 01/23/2025        Lab Results   Component Value Date    CHOL 210 (H) 01/23/2025    CHOL 209 (H) 03/11/2024    CHOL 129 08/03/2023     Lab Results   Component Value Date    HDL 34 (L) 01/23/2025    HDL 39 (L) 03/11/2024    HDL 30 (L) 08/03/2023     Lab Results   Component Value Date    LDLCALC 109 01/23/2025    LDLCALC 135 03/11/2024    LDLCALC 75 08/03/2023     Lab Results   Component Value Date    TRIG 334 (H) 01/23/2025    TRIG 177 (H) 03/11/2024    TRIG 118 08/03/2023      Lab Results   Component Value Date    CHOLHDL 6.2 01/23/2025    CHOLHDL 5.4 03/11/2024    CHOLHDL 4.3 08/03/2023        Wt Readings from Last 3 Encounters:   04/03/25 1559 (!) 163.6 kg (360 lb 9.6 oz)   02/20/25 0914 (!) 163.7 kg (361 lb)   02/06/25 0927 (!) 163.3 kg (360 lb)        BP Readings from Last 3 Encounters:   04/03/25 (!) 168/105   02/20/25 (!) 144/86   02/06/25 (!) 154/102        Review of Systems     Review of Systems   Constitutional:  Negative for activity change and unexpected weight change.   HENT:  Negative for hearing loss, rhinorrhea and trouble swallowing.    Eyes:  Negative for discharge and visual disturbance.   Respiratory:  Negative for chest tightness and wheezing.    Cardiovascular:  Negative for chest pain and palpitations.   Gastrointestinal:  Negative for blood in stool, constipation, diarrhea and vomiting.   Endocrine: Negative for polydipsia and polyuria.   Genitourinary:  Positive for menstrual problem. Negative for difficulty urinating, dysuria and hematuria.   Musculoskeletal:  Negative for arthralgias, joint swelling and neck pain.   Neurological:  Negative for weakness and headaches.   Psychiatric/Behavioral:  Negative for confusion and dysphoric mood.           Medical / Social / Family History     Past Medical History:   Diagnosis Date    Diabetes mellitus, type 2     Diabetic eye exam 06/07/2024    Dr. [facility]    Hyperlipidemia     Hypertension        Past Surgical History:   Procedure Laterality Date    spleenectomy  2009       Social History  Ms.  reports that she has quit smoking. Her smoking use included cigarettes. She started smoking about 22 years ago. She has a 20.4 pack-year smoking history. She has been exposed to tobacco smoke. She has never used smokeless tobacco. She reports that she does not currently use drugs. She reports that she does not drink alcohol.    Family History  Ms.'s family history includes Cancer in her maternal  "grandmother; Colon cancer in her father; Diabetes in her father; Hypertension in her maternal grandmother and mother; Stroke in her mother.    Medications and Allergies     Medications  Outpatient Medications Marked as Taking for the 4/3/25 encounter (Office Visit) with Lizbeth Rouse FNP   Medication Sig Dispense Refill    ACCU-CHEK GUIDE GLUCOSE METER Misc USE 1 ONCE DAILY DUE TO DIABETES      ACCU-CHEK SOFTCLIX LANCETS Misc 1 Lancet by local intranasal application route Daily. 100 each 2    amLODIPine (NORVASC) 10 MG tablet Take 1 tablet (10 mg total) by mouth once daily. 90 tablet 3    blood sugar diagnostic Strp 1 strip by Misc.(Non-Drug; Combo Route) route once daily. 100 strip 3    hydroCHLOROthiazide (HYDRODIURIL) 25 MG tablet Take 1 tablet (25 mg total) by mouth once daily. 30 tablet 5    lancets (TRUEPLUS LANCETS) 33 gauge Misc 1 lancet  by Misc.(Non-Drug; Combo Route) route once daily. 100 each 3    lisinopriL (PRINIVIL,ZESTRIL) 40 MG tablet Take 1 tablet (40 mg total) by mouth once daily. 30 tablet 5    metFORMIN (GLUCOPHAGE) 500 MG tablet Take 1 tablet (500 mg total) by mouth 2 (two) times daily with meals. 60 tablet 5    semaglutide (OZEMPIC) 0.25 mg or 0.5 mg (2 mg/3 mL) pen injector Inject 0.5 mg into the skin every 7 days. 1 each 1       Allergies  Review of patient's allergies indicates:   Allergen Reactions    Sulfamethoxazole-trimethoprim Rash       Physical Examination     Vitals:    04/03/25 1559 04/03/25 2119   BP: (!) 170/111 (!) 168/105   BP Location: Left forearm    Patient Position: Sitting    Pulse: 89    Resp: 20    Temp: 98.8 °F (37.1 °C)    TempSrc: Oral    SpO2: 97%    Weight: (!) 163.6 kg (360 lb 9.6 oz)    Height: 5' 6" (1.676 m)       Physical Exam  Constitutional:       Appearance: Normal appearance. She is obese.   HENT:      Head: Normocephalic.      Nose: Nose normal.   Eyes:      Extraocular Movements: Extraocular movements intact.   Cardiovascular:      Rate and Rhythm: Normal " rate and regular rhythm.      Pulses: Normal pulses.           Dorsalis pedis pulses are 2+ on the right side and 2+ on the left side.        Posterior tibial pulses are 2+ on the right side and 2+ on the left side.      Heart sounds: Normal heart sounds.   Pulmonary:      Effort: Pulmonary effort is normal.      Breath sounds: Normal breath sounds.   Musculoskeletal:      Right foot: Normal range of motion. No deformity, bunion, Charcot foot, foot drop or prominent metatarsal heads.      Left foot: Normal range of motion. No deformity, bunion, Charcot foot, foot drop or prominent metatarsal heads.   Feet:      Right foot:      Protective Sensation: 10 sites tested.  10 sites sensed.      Skin integrity: Dry skin present. No ulcer, blister, skin breakdown, erythema, warmth, callus or fissure.      Toenail Condition: Right toenails are normal.      Left foot:      Protective Sensation: 10 sites tested.  10 sites sensed.      Skin integrity: Dry skin present. No ulcer, blister, skin breakdown, erythema, warmth, callus or fissure.      Toenail Condition: Left toenails are normal.      Comments: Pt has what looks to be like athlete's foot to left foot  Skin:     General: Skin is warm and dry.      Capillary Refill: Capillary refill takes less than 2 seconds.   Neurological:      General: No focal deficit present.      Mental Status: She is alert and oriented to person, place, and time.   Psychiatric:         Mood and Affect: Mood normal.         Behavior: Behavior normal.          Assessment and Plan (including Health Maintenance)      Problem List  Smart Sets  Document Outside HM   :    Plan:     There are no Patient Instructions on file for this visit.       Health Maintenance Due   Topic Date Due    Hepatitis C Screening  Never done    HIV Screening  Never done    TETANUS VACCINE  Never done    COVID-19 Vaccine (1 - 2024-25 season) Never done    Hemoglobin A1c  04/23/2025    Diabetic Eye Exam  06/07/2025        Problem List Items Addressed This Visit       History of abnormal cervical Pap smear    Relevant Orders    Ambulatory referral/consult to Gynecology    Menstrual irregularity - Primary    Relevant Orders    TSH    Possible exposure to STD    Relevant Orders    Hepatitis C Antibody    HIV 1/2 Ag/Ab (4th Gen)    Chlamydia/GC, PCR    Syphilis Antibody with reflex to RPR    HSV 1 & 2, IgG    POCT URINALYSIS W/O SCOPE (Completed)     Assessment & Plan    E11.9 Type 2 diabetes mellitus without complications  I10 Essential (primary) hypertension  B35.3 Tinea pedis  Z87.410 Personal history of cervical dysplasia  Z80.3 Family history of malignant neoplasm of breast  Z82.3 Family history of stroke  Z80.0 Family history of malignant neoplasm of digestive organs  N97.9 Female infertility, unspecified  Z63.8 Other specified problems related to primary support group    IMPRESSION:  - Evaluated diabetes management, including medication adherence and foot exam.  - Noted family history of breast cancer, but determined mammogram screening not yet indicated due to age, but she can follow up with Gynecology to see if they recommend this at this time as pt did ask if she should have a mammogram or not.     TYPE 2 DIABETES MELLITUS:  - Discussed signs of high and low blood sugar with the patient.  - Performed a foot exam to check for diabetic neuropathy.  - Patient has good feeling in toes, which is a positive sign.  - Noted that the patient's A1C was high in previous labs.  - Discussed the impact of diabetes on conception and other health issues.  - Patient reports recognizing symptoms of high blood sugar, including excessive thirst, and managing low blood sugar with peanut butter snacks.  - Continued the patient's current diabetes management regimen of Ozempic (injectable) and Metformin (oral).  - Ordered thyroid function test due to concerns about conception.    ESSENTIAL HYPERTENSION:  - Noted that the patient's blood pressure  is currently very high/. She denies ssx of htn.   - Assessed that the patient's high blood pressure may be due to recent stress from family health issues and not taking her medication.   - Continued lisinopril, hydrochlorothiazide (HCTZ), and amlodipine for BP management at maximum doses.  - Instructed the patient to take all 3 medications as prescribed, not just amlodipine.  - Emphasized the importance of taking all prescribed BP medications.  - Advised the patient she can come to the clinic anytime to have BP checked as she states she does not have a bp cuff at home.     TINEA PEDIS (ATHLETE'S FOOT):  - Evaluated the patient's foot, observing symptoms consistent with fungal infection (athlete's foot).  - Educated the patient on the possibility of fungal infection (athlete's foot) and treatment options.  - Explained that the condition is likely due to sweating and moisture.  - Recommend OTC antifungal treatments such as creams or sprays for athlete's foot.    HISTORY OF CERVICAL DYSPLASIA:  - Noted that the patient had an abnormal pap smear last year, leading to a procedure.  - Recorded that the patient's last pap smear was in June of last year, and a repeat was recommended in 1 year.  - Referred the patient to Dr. Ybarra, gynecologist at [facility], for follow-up on abnormal pap smear from previous year.    FAMILY HISTORY OF BREAST CANCER:  - Noted the patient's strong family history of breast cancer, with 3 cousins affected, 1 requiring breast removal.  - Patient denies any current breast issues or lumps.  - Suggested discussing mammogram with Dr. Ybarra due to family history, despite patient being under 40.  - Informed the patient that gynecologists typically perform breast exams during visits as well.    FAMILY HISTORY OF STROKE:  - Noted that mother recently had a stroke.  - Emphasized the importance of blood pressure control due to family history of stroke.    FAMILY HISTORY OF DIGESTIVE ORGAN CANCER:  - Noted  that father recently had surgery for anal cancer.    FEMALE INFERTILITY:  - Discussed multiple factors potentially affecting fertility, including high blood pressure, diabetes, and abnormal pap smear.  - Referred the patient to a gynecologist for further evaluation of fertility issues.    FAMILY-RELATED STRESS:  - Acknowledged the patient's stressful situation due to family health issues and being the sole provider.  - Discussed the potential impact of stress on the patient's health.    ADDITIONAL INFORMATION:  - Provided information about HSV testing and potential sources of infection.  - Ordered comprehensive STD panel including HSV 1 and 2, syphilis, chlamydia, gonorrhea, HIV, and hepatitis C. Ordered urinalysis.  - Pt already had f/u with Dr. Carolina 4/28/25 and instructed to keep this visit or she is welcome to come back to this clinic if desires. Pt vu.        Menstrual irregularity  -     TSH; Future; Expected date: 04/03/2025    History of abnormal cervical Pap smear  -     Ambulatory referral/consult to Gynecology; Future; Expected date: 04/10/2025    Possible exposure to STD  -     Hepatitis C Antibody; Future; Expected date: 04/03/2025  -     HIV 1/2 Ag/Ab (4th Gen); Future; Expected date: 04/03/2025  -     Chlamydia/GC, PCR  -     Syphilis Antibody with reflex to RPR; Future; Expected date: 04/03/2025  -     HSV 1 & 2, IgG; Future; Expected date: 04/03/2025  -     POCT URINALYSIS W/O SCOPE       Health Maintenance Topics with due status: Not Due       Topic Last Completion Date    Cervical Cancer Screening 06/06/2024    Diabetes Urine Screening 01/23/2025    Lipid Panel 01/23/2025    Foot Exam 04/03/2025    RSV Vaccine (Age 60+ and Pregnant patients) Not Due         Future Appointments   Date Time Provider Department Center   4/28/2025  9:40 AM Jocelynn Carolina MD [facility]        No follow-ups on file.    Health Maintenance Due   Topic Date Due    Hepatitis C Screening  Never done     HIV Screening  Never done    TETANUS VACCINE  Never done    COVID-19 Vaccine (1 - 2024-25 season) Never done    Hemoglobin A1c  04/23/2025    Diabetic Eye Exam  06/07/2025        Signature:  KRISTOPHER Welch    Date of encounter: 4/3/25  This note was generated with the assistance of ambient listening technology. Verbal consent was obtained by the patient and accompanying visitor(s) for the recording of patient appointment to facilitate this note. I attest to having reviewed and edited the generated note for accuracy, though some syntax or spelling errors may persist. Please contact the author of this note for any clarification.

## 2025-04-16 ENCOUNTER — HOSPITAL ENCOUNTER (EMERGENCY)
Facility: HOSPITAL | Age: 35
Discharge: HOME | End: 2025-04-16
Payer: COMMERCIAL

## 2025-04-16 VITALS
RESPIRATION RATE: 18 BRPM | TEMPERATURE: 98 F | HEIGHT: 67 IN | DIASTOLIC BLOOD PRESSURE: 90 MMHG | OXYGEN SATURATION: 99 % | WEIGHT: 293 LBS | HEART RATE: 80 BPM | SYSTOLIC BLOOD PRESSURE: 171 MMHG | BODY MASS INDEX: 45.99 KG/M2

## 2025-04-16 DIAGNOSIS — R51.9 ACUTE NONINTRACTABLE HEADACHE, UNSPECIFIED HEADACHE TYPE: Primary | ICD-10-CM

## 2025-04-16 DIAGNOSIS — I10 HYPERTENSION, UNSPECIFIED TYPE: ICD-10-CM

## 2025-04-16 PROCEDURE — 99284 EMERGENCY DEPT VISIT MOD MDM: CPT | Mod: 25

## 2025-04-16 PROCEDURE — 99284 EMERGENCY DEPT VISIT MOD MDM: CPT | Mod: ,,, | Performed by: NURSE PRACTITIONER

## 2025-04-16 PROCEDURE — 96372 THER/PROPH/DIAG INJ SC/IM: CPT | Performed by: NURSE PRACTITIONER

## 2025-04-16 PROCEDURE — 63600175 PHARM REV CODE 636 W HCPCS: Mod: JZ,TB | Performed by: NURSE PRACTITIONER

## 2025-04-16 RX ORDER — KETOROLAC TROMETHAMINE 30 MG/ML
60 INJECTION, SOLUTION INTRAMUSCULAR; INTRAVENOUS
Status: COMPLETED | OUTPATIENT
Start: 2025-04-16 | End: 2025-04-16

## 2025-04-16 RX ADMIN — KETOROLAC TROMETHAMINE 60 MG: 30 INJECTION, SOLUTION INTRAMUSCULAR at 09:04

## 2025-04-16 NOTE — DISCHARGE INSTRUCTIONS
-May start Motrin 200 mg in 6 hours: take 3 tabs by mouth every 6 hours if needed for headache.  -Tylenol 500 mg: take 2 tabs by mouth every 6 hours if needed for headache.  -Ice pack on 10-15 minutes then off 30 minutes    -Take routine blood pressure medications as previously prescribed. Monitor blood pressure and follow up with your provider.

## 2025-04-16 NOTE — ED PROVIDER NOTES
Encounter Date: 4/16/2025       History     Chief Complaint   Patient presents with    Headache     34 y/o female arrived to the ED via POV with complaint of headache that started last night. Headache present left occipital region, throbbing and rates pain 10/10. Has not taken anything for pain. Denies visual disturbance, nausea or vomiting.    The history is provided by the patient.   Headache   This is a new problem. The current episode started yesterday. The problem occurs constantly. The problem has been gradually worsening. The pain is located in the Occipital region. The pain does not radiate. The pain quality is not similar to prior headaches. The quality of the pain is described as throbbing. The pain is at a severity of 10/10. Associated symptoms include scalp tenderness. Pertinent negatives include no abdominal pain, abnormal behavior, anorexia, back pain, blurred vision, coughing, dizziness, drainage, ear pain, eye pain, eye redness, eye watering, facial sweating, fever, hearing loss, insomnia, loss of balance, muscle aches, nausea, neck pain, numbness, phonophobia, photophobia, rhinorrhea, seizures, sinus pressure, sore throat, swollen glands, tingling, tinnitus, visual change, vomiting, weakness or weight loss. Nothing aggravates the symptoms. She has tried nothing for the symptoms. Her past medical history is significant for hypertension. There is no history of cancer, cluster headaches, immunosuppression, migraine headaches, migraines in the family, obesity, pseudotumor cerebri, recent head traumas, sinus disease or TMJ.     Review of patient's allergies indicates:   Allergen Reactions    Sulfamethoxazole-trimethoprim Rash     Past Medical History:   Diagnosis Date    Diabetes mellitus, type 2     Diabetic eye exam 06/07/2024    [facility] [place]    Hyperlipidemia     Hypertension      Past Surgical History:   Procedure Laterality Date    spleenectomy  2009     Family History    Problem Relation Name Age of Onset    Hypertension Mother      Stroke Mother      Diabetes Father      Colon cancer Father      Cancer Maternal Grandmother      Hypertension Maternal Grandmother       Social History[1]  Review of Systems   Constitutional:  Negative for activity change, appetite change, chills, fatigue, fever and weight loss.   HENT:  Negative for congestion, dental problem, ear pain, hearing loss, postnasal drip, rhinorrhea, sinus pressure, sinus pain, sore throat, tinnitus and trouble swallowing.    Eyes:  Negative for blurred vision, photophobia, pain, redness and visual disturbance.   Respiratory:  Negative for cough and shortness of breath.    Cardiovascular:  Negative for chest pain, palpitations and leg swelling.   Gastrointestinal:  Negative for abdominal pain, anorexia, constipation, diarrhea, nausea and vomiting.   Genitourinary:  Negative for dysuria, flank pain and frequency.   Musculoskeletal:  Negative for back pain, gait problem, neck pain and neck stiffness.   Neurological:  Positive for headaches. Negative for dizziness, tingling, seizures, speech difficulty, weakness, light-headedness, numbness and loss of balance.   Psychiatric/Behavioral:  The patient does not have insomnia.    All other systems reviewed and are negative.      Physical Exam     Initial Vitals [04/16/25 0940]   BP Pulse Resp Temp SpO2   (!) 192/136 78 18 98.3 °F (36.8 °C) 99 %      MAP       --         Physical Exam    Nursing note and vitals reviewed.  Constitutional: She appears well-developed and well-nourished. She is Obese . She is cooperative. She does not appear ill. No distress.   HENT:   Head: Normocephalic and atraumatic.   Right Ear: Tympanic membrane, external ear and ear canal normal.   Left Ear: Tympanic membrane, external ear and ear canal normal. Mouth/Throat: Uvula is midline, oropharynx is clear and moist and mucous membranes are normal.   Mild cerumen present in bilateral ear canal.   Eyes:  Conjunctivae, EOM and lids are normal. Pupils are equal, round, and reactive to light.   Neck: Neck supple.   Normal range of motion.   Full passive range of motion without pain.     Cardiovascular:  Normal rate, regular rhythm, normal heart sounds, intact distal pulses and normal pulses.           Pulmonary/Chest: Effort normal and breath sounds normal.   Abdominal: Abdomen is soft. There is no abdominal tenderness.   Musculoskeletal:      Cervical back: Normal, full passive range of motion without pain, normal range of motion and neck supple. No spasms, tenderness or bony tenderness. No pain with movement, spinous process tenderness or muscular tenderness. Normal range of motion.      Thoracic back: Normal.     Lymphadenopathy:     She has no cervical adenopathy.   Neurological: She is alert and oriented to person, place, and time. She has normal strength. No cranial nerve deficit or sensory deficit. Coordination and gait normal. GCS eye subscore is 4. GCS verbal subscore is 5. GCS motor subscore is 6.   Skin: Skin is warm, dry and intact. Capillary refill takes less than 2 seconds.   Psychiatric: She has a normal mood and affect. Her speech is normal and behavior is normal. Judgment and thought content normal. Cognition and memory are normal.         Medical Screening Exam   See Full Note    ED Course   Procedures  Labs Reviewed - No data to display       Imaging Results    None          Medications   ketorolac injection 60 mg (60 mg Intramuscular Given 4/16/25 0952)     Medical Decision Making  36 y/o female arrived to the ED via POV with complaint of headache that started last night. Headache present left occipital region, throbbing and rates pain 10/10. Has not taken anything for pain. Denies visual disturbance, nausea or vomiting.  Patient has not taken her home BP medication today    Problems Addressed:  Acute nonintractable headache, unspecified headache type: acute illness or injury     Details: Toradol 60  mg IM x 1. Headache improved. Reviewed discharge instructions with patient. She agreed to treatment plan and verbalized understanding.   Hypertension, unspecified type: chronic illness or injury     Details: Take home blood pressure medication as previously prescribed. Monitor blood pressure and follow up with PCP.    Risk  Prescription drug management.                                      Clinical Impression:   Final diagnoses:  [R51.9] Acute nonintractable headache, unspecified headache type (Primary)  [I10] Hypertension, unspecified type        ED Disposition Condition    Discharge Stable          ED Prescriptions    None       Follow-up Information       Follow up With Specialties Details Why Contact Info    Jocelynn Carolina MD Family Medicine Call today schedule appointment to follow up from your ER visit for recheck on blood pressure [address] [facility] [place] [address]  [phone]                 [1]   Social History  Tobacco Use    Smoking status: Former     Average packs/day: 1 pack/day for 20.4 years (20.4 ttl pk-yrs)     Types: Cigarettes     Start date: 3/5/2003     Passive exposure: Past    Smokeless tobacco: Never   Substance Use Topics    Alcohol use: Never    Drug use: Not Currently        Deborah Wetzel, KRISTOPHER  04/16/25 1001

## 2025-04-16 NOTE — Clinical Note
"Norma "Norma" Mirza was seen and treated in our emergency department on 4/16/2025.  She may return to work on 04/17/2025.       If you have any questions or concerns, please don't hesitate to call.      Deborah Wetzel, MACARIOP"

## 2025-04-16 NOTE — ED TRIAGE NOTES
Pt to ED with c/o headache that started last night. Pt has not taken anything for pain. Pt states she has not taken her BP medicine today.

## 2025-04-20 ENCOUNTER — HOSPITAL ENCOUNTER (EMERGENCY)
Facility: HOSPITAL | Age: 35
Discharge: HOME | End: 2025-04-21
Payer: COMMERCIAL

## 2025-04-20 VITALS
SYSTOLIC BLOOD PRESSURE: 169 MMHG | OXYGEN SATURATION: 95 % | DIASTOLIC BLOOD PRESSURE: 78 MMHG | WEIGHT: 293 LBS | RESPIRATION RATE: 20 BRPM | TEMPERATURE: 100 F | HEART RATE: 94 BPM | HEIGHT: 66 IN | BODY MASS INDEX: 47.09 KG/M2

## 2025-04-20 DIAGNOSIS — J06.9 UPPER RESPIRATORY TRACT INFECTION, UNSPECIFIED TYPE: Primary | ICD-10-CM

## 2025-04-20 LAB
INFLUENZA A MOLECULAR (OHS): NEGATIVE
INFLUENZA B MOLECULAR (OHS): NEGATIVE
SARS-COV-2 RDRP RESP QL NAA+PROBE: NEGATIVE

## 2025-04-20 PROCEDURE — 99284 EMERGENCY DEPT VISIT MOD MDM: CPT | Mod: ,,, | Performed by: NURSE PRACTITIONER

## 2025-04-20 PROCEDURE — 99284 EMERGENCY DEPT VISIT MOD MDM: CPT

## 2025-04-20 PROCEDURE — 87635 SARS-COV-2 COVID-19 AMP PRB: CPT | Performed by: NURSE PRACTITIONER

## 2025-04-20 PROCEDURE — 87502 INFLUENZA DNA AMP PROBE: CPT | Performed by: NURSE PRACTITIONER

## 2025-04-20 RX ORDER — KETOCONAZOLE 20 MG/ML
SHAMPOO, SUSPENSION TOPICAL
COMMUNITY
Start: 2025-04-12

## 2025-04-20 RX ORDER — TRIAMCINOLONE ACETONIDE 1 MG/G
CREAM TOPICAL 2 TIMES DAILY PRN
COMMUNITY
Start: 2025-04-12

## 2025-04-20 RX ORDER — BETAMETHASONE DIPROPIONATE 0.5 MG/ML
1 LOTION, AUGMENTED TOPICAL 2 TIMES DAILY PRN
COMMUNITY
Start: 2025-04-12

## 2025-04-20 NOTE — Clinical Note
"Norma "Norma" Mirza was seen and treated in our emergency department on 4/20/2025.  She may return to work on 04/22/2025.       If you have any questions or concerns, please don't hesitate to call.      Leonora Glynn FNP"

## 2025-04-21 ENCOUNTER — TELEPHONE (OUTPATIENT)
Dept: EMERGENCY MEDICINE | Facility: HOSPITAL | Age: 35
End: 2025-04-21
Payer: COMMERCIAL

## 2025-04-21 PROCEDURE — 63600175 PHARM REV CODE 636 W HCPCS: Performed by: NURSE PRACTITIONER

## 2025-04-21 PROCEDURE — 96372 THER/PROPH/DIAG INJ SC/IM: CPT | Performed by: NURSE PRACTITIONER

## 2025-04-21 RX ORDER — AZITHROMYCIN 250 MG/1
TABLET, FILM COATED ORAL
Qty: 6 TABLET | Refills: 0 | Status: SHIPPED | OUTPATIENT
Start: 2025-04-21 | End: 2025-04-26

## 2025-04-21 RX ORDER — KETOROLAC TROMETHAMINE 30 MG/ML
30 INJECTION, SOLUTION INTRAMUSCULAR; INTRAVENOUS
Status: COMPLETED | OUTPATIENT
Start: 2025-04-21 | End: 2025-04-21

## 2025-04-21 RX ORDER — CHLORPHENIRAMINE MALEATE AND PHENYLEPHRINE HYDROCHLORIDE 4; 10 MG/1; MG/1
1 TABLET, COATED ORAL EVERY 6 HOURS PRN
Qty: 20 TABLET | Refills: 0 | Status: SHIPPED | OUTPATIENT
Start: 2025-04-21 | End: 2025-05-01

## 2025-04-21 RX ADMIN — CEFTRIAXONE SODIUM 1 G: 1 INJECTION, POWDER, FOR SOLUTION INTRAMUSCULAR; INTRAVENOUS at 12:04

## 2025-04-21 RX ADMIN — KETOROLAC TROMETHAMINE 30 MG: 30 INJECTION, SOLUTION INTRAMUSCULAR at 12:04

## 2025-04-21 NOTE — DISCHARGE INSTRUCTIONS
Use prescriptions as directed. Alternate Tylenol and Ibuprofen as needed for pain or fever. Drink plenty of fluids. Follow up with your primary care provider if no improvement or otherwise as needed. Return to the ED for worsening signs and symptoms or otherwise as needed.

## 2025-04-26 NOTE — ED PROVIDER NOTES
Encounter Date: 4/20/2025       History     Chief Complaint   Patient presents with    Cough    Headache    Nasal Congestion     Pt states her symptoms started last night and presents to the ED for evaluation. Pt states she has chest pain from coughing a lot and has generalized body aches.     Generalized Body Aches    Chest Pain     36 y/o AAF presents to the emergency department with c/o headache, nasal congestion, cough and generalized body aches. Patient states her symptoms started last night and have progressively worsened. She denies shortness of breath. Reports has body aches and chest wall pain when coughing. No nausea or vomiting or diarrhea. No known sick contacts. Cough is unproductive but feels like she needs to cough something up. Subjective fevers and chills; unknown T max. She has had over the counter medications with no improvement.     The history is provided by the patient.     Review of patient's allergies indicates:   Allergen Reactions    Sulfamethoxazole-trimethoprim Rash     Past Medical History:   Diagnosis Date    Diabetes mellitus, type 2     Diabetic eye exam 06/07/2024    Dr. Eli [facility]    Hyperlipidemia     Hypertension      Past Surgical History:   Procedure Laterality Date    spleenectomy  2009     Family History   Problem Relation Name Age of Onset    Hypertension Mother      Stroke Mother      Diabetes Father      Colon cancer Father      Cancer Maternal Grandmother      Hypertension Maternal Grandmother       Social History[1]  Review of Systems   All other systems reviewed and are negative.      Physical Exam     Initial Vitals [04/20/25 2308]   BP Pulse Resp Temp SpO2   (!) 169/78 94 20 99.7 °F (37.6 °C) 95 %      MAP       --         Physical Exam    Constitutional: She appears well-developed and well-nourished. She is cooperative.  Non-toxic appearance.   BMI >30   HENT:   Nose: Mucosal edema and rhinorrhea present. Right sinus exhibits frontal sinus  tenderness. Left sinus exhibits frontal sinus tenderness. Mouth/Throat: Oropharynx is clear and moist and mucous membranes are normal.   PND   Cardiovascular:  Normal rate, regular rhythm and normal heart sounds.           Pulmonary/Chest: Effort normal. She has rhonchi.   Abdominal: Abdomen is soft. Bowel sounds are normal. There is no abdominal tenderness.     Neurological: She is alert and oriented to person, place, and time. She has normal strength. GCS eye subscore is 4. GCS verbal subscore is 5. GCS motor subscore is 6.   Skin: Skin is warm, dry and intact. Capillary refill takes less than 2 seconds.         Medical Screening Exam   See Full Note    ED Course   Procedures  Labs Reviewed   INFLUENZA A & B BY MOLECULAR - Normal       Result Value    INFLUENZA A MOLECULAR Negative      INFLUENZA B MOLECULAR  Negative     SARS-COV-2 RNA AMPLIFICATION, QUAL - Normal    SARS COV-2 Molecular Negative      Narrative:     Negative SARS-CoV results should not be used as the sole basis for treatment or patient management decisions; negative results should be considered in the context of a patient's recent exposures, history and the presene of clinical signs and symptoms consistent with COVID-19.  Negative results should be treated as presumptive and confirmed by molecular assay, if necessary for patient management.          Imaging Results    None          Medications   cefTRIAXone (Rocephin) 1 g in LIDOcaine HCL 10 mg/ml (1%) 4 mL IM only syringe (1 g Intramuscular Given 4/21/25 0026)   ketorolac injection 30 mg (30 mg Intramuscular Given 4/21/25 0027)     Medical Decision Making  34 y/o AAF presents to the emergency department with c/o headache, nasal congestion, cough and generalized body aches. Patient states her symptoms started last night and have progressively worsened. She denies shortness of breath. Reports has body aches and chest wall pain when coughing. No nausea or vomiting or diarrhea. No known sick  contacts. Cough is unproductive but feels like she needs to cough something up. Subjective fevers and chills; unknown T max. She has had over the counter medications with no improvement.     Problems Addressed:  Upper respiratory tract infection, unspecified type:     Details: Afebrile, non toxic appearing. Does appear ill, however. COVID and Influenza negative. Significant sinus tenderness, nasal congestion and inflammation. Thick PND. Rocephin and Toradol given. No steroids at this time with hx of uncontrolled DM. Rx Zpack and Ed ahist; counseled on use and supportive measures. Follow up instructions given. Warning s/s discussed and return precautions given; the patient has v/u.      Risk  OTC drugs.  Prescription drug management.                                      Clinical Impression:   Final diagnoses:  [J06.9] Upper respiratory tract infection, unspecified type (Primary)        ED Disposition Condition    Discharge Stable          ED Prescriptions       Medication Sig Dispense Start Date End Date Auth. Provider    azithromycin (Z-ALLY) 250 MG tablet (Expires today) Take 2 tablets by mouth on day 1; Take 1 tablet by mouth on days 2-5 6 tablet 4/21/2025 4/26/2025 Leonora Glynn FNP    chlorpheniramine-phenylephrine (ED A-HIST) 4-10 mg per tablet Take 1 tablet by mouth every 6 (six) hours as needed for Congestion. 20 tablet 4/21/2025 5/1/2025 Leonora Glynn FNP          Follow-up Information       Follow up With Specialties Details Why Contact Info    Jocelynn Carolina MD Family Medicine   [address] [facility] [address] [phone]                 [1]   Social History  Tobacco Use    Smoking status: Former     Average packs/day: 1 pack/day for 20.4 years (20.4 ttl pk-yrs)     Types: Cigarettes     Start date: 3/5/2003     Passive exposure: Past    Smokeless tobacco: Never   Substance Use Topics    Alcohol use: Never    Drug use: Not Currently        Gretta  Leonora, ARCHANA  04/26/25 1528

## 2025-06-10 ENCOUNTER — OFFICE VISIT (OUTPATIENT)
Dept: FAMILY MEDICINE | Facility: CLINIC | Age: 35
End: 2025-06-10
Payer: COMMERCIAL

## 2025-06-10 VITALS
HEART RATE: 79 BPM | HEIGHT: 66 IN | OXYGEN SATURATION: 97 % | SYSTOLIC BLOOD PRESSURE: 156 MMHG | TEMPERATURE: 99 F | DIASTOLIC BLOOD PRESSURE: 79 MMHG | RESPIRATION RATE: 18 BRPM | BODY MASS INDEX: 47.09 KG/M2 | WEIGHT: 293 LBS

## 2025-06-10 DIAGNOSIS — E11.65 TYPE 2 DIABETES MELLITUS WITH HYPERGLYCEMIA, WITHOUT LONG-TERM CURRENT USE OF INSULIN: ICD-10-CM

## 2025-06-10 DIAGNOSIS — I10 ESSENTIAL HYPERTENSION: Primary | ICD-10-CM

## 2025-06-10 DIAGNOSIS — Z91.199 MEDICALLY NONCOMPLIANT: ICD-10-CM

## 2025-06-10 PROCEDURE — 3008F BODY MASS INDEX DOCD: CPT | Mod: CPTII,,, | Performed by: NURSE PRACTITIONER

## 2025-06-10 PROCEDURE — 3046F HEMOGLOBIN A1C LEVEL >9.0%: CPT | Mod: CPTII,,, | Performed by: NURSE PRACTITIONER

## 2025-06-10 PROCEDURE — 3077F SYST BP >= 140 MM HG: CPT | Mod: CPTII,,, | Performed by: NURSE PRACTITIONER

## 2025-06-10 PROCEDURE — 1160F RVW MEDS BY RX/DR IN RCRD: CPT | Mod: CPTII,,, | Performed by: NURSE PRACTITIONER

## 2025-06-10 PROCEDURE — 4010F ACE/ARB THERAPY RXD/TAKEN: CPT | Mod: CPTII,,, | Performed by: NURSE PRACTITIONER

## 2025-06-10 PROCEDURE — 1159F MED LIST DOCD IN RCRD: CPT | Mod: CPTII,,, | Performed by: NURSE PRACTITIONER

## 2025-06-10 PROCEDURE — 99214 OFFICE O/P EST MOD 30 MIN: CPT | Mod: ,,, | Performed by: NURSE PRACTITIONER

## 2025-06-10 PROCEDURE — 3078F DIAST BP <80 MM HG: CPT | Mod: CPTII,,, | Performed by: NURSE PRACTITIONER

## 2025-06-10 NOTE — LETTER
[facility]  [address] [place] [address]  Phone: [phone]  Fax: [phone] Edna 10, 2025     Patient: Norma Blue   YOB: 1990   Date of Visit: 6/10/2025       To Whom It May Concern:    Norma Blue is a patient of Dr. Jocelynn Aldridge. She has uncontrolled hypertension, but she does take medication for it. The medications she takes for hypertension are as follows:  Amlodipine 10 mg daily  Hydrochlorothiazide 25 mg daily  Lisinopril 40 mg daily       If you have any questions or concerns, please don't hesitate to contact my office.    Sincerely,        Cristina Brooks, BRISEIDA, FNP

## 2025-06-16 NOTE — PATIENT INSTRUCTIONS
Keep follow up appointments with primary care provider. Monitor blood pressure for 2 weeks. Make sure that you have empty bladder, resting for 5 minutes, and feet flat on the floor.

## 2025-06-16 NOTE — PROGRESS NOTES
Cristina Brooks DNP, KRISTOPHER    [address] [place] [address]     PATIENT NAME: Norma Blue  : 1990  DATE: 6/10/25  MRN: [id number]      Billing Provider: Cristina Brooks DNP, FNP  Level of Service:   Patient PCP Information       Provider PCP Type    Jocelynn Carolina MD General            Reason for Visit / Chief Complaint: Hypertension (Patient states she is here today for a blood pressure check. She starts a new job at [employer] and she states they are recommending a letter stating patient has high blood pressure. )       Update PCP  Update Chief Complaint         History of Present Illness / Problem Focused Workflow     Norma Blue presents to the clinic with Hypertension (Patient states she is here today for a blood pressure check. She starts a new job at [employer] and she states they are recommending a letter stating patient has high blood pressure. )     Pt has been noncompliant with follow ups and has uncontrolled hypertension.     Review of Systems     Review of Systems   Constitutional:  Negative for appetite change, fatigue, fever and unexpected weight change.   HENT:  Negative for hearing loss.    Eyes:  Negative for visual disturbance.   Respiratory:  Negative for shortness of breath.    Cardiovascular:  Negative for chest pain.   Gastrointestinal:  Negative for abdominal pain, constipation, diarrhea, nausea and vomiting.   Genitourinary:  Negative for dysuria.   Musculoskeletal:  Negative for back pain.   Psychiatric/Behavioral:  Negative for sleep disturbance.         Medical / Social / Family History     Past Medical History:   Diagnosis Date    Diabetes mellitus, type 2     Diabetic eye exam 2024    Dr. Eli [facility] [place]    Hyperlipidemia     Hypertension        Past Surgical History:   Procedure Laterality Date    spleenectomy         Social History  Ms. Norma Blue  reports that she has quit smoking. Her  smoking use included cigarettes. She started smoking about 22 years ago. She has a 20.4 pack-year smoking history. She has been exposed to tobacco smoke. She has never used smokeless tobacco. She reports that she does not currently use drugs. She reports that she does not drink alcohol.    Family History  Ms. Nomra Blue's family history includes Cancer in her maternal grandmother; Colon cancer in her father; Diabetes in her father; Hypertension in her maternal grandmother and mother; Stroke in her mother.    Medications and Allergies     Medications  Outpatient Medications Marked as Taking for the 6/10/25 encounter (Office Visit) with Cristina Brooks, BRISEIDA, FNP   Medication Sig Dispense Refill    ACCU-CHEK GUIDE GLUCOSE METER Mis USE 1 ONCE DAILY DUE TO DIABETES      ACCU-CHEK SOFTCLIX LANCETS [facility] 1 Lancet by local intranasal application route Daily. 100 each 2    amLODIPine (NORVASC) 10 MG tablet Take 1 tablet (10 mg total) by mouth once daily. 90 tablet 3    atorvastatin (LIPITOR) 10 MG tablet Take 1 tablet (10 mg total) by mouth once daily. 90 tablet 3    augmented betamethasone (DIPROLENE) 0.05 % lotion Apply 1 application  topically 2 (two) times daily as needed.      blood sugar diagnostic Strp 1 strip by Misc.(Non-Drug; Combo Route) route once daily. 100 strip 3    hydroCHLOROthiazide (HYDRODIURIL) 25 MG tablet Take 1 tablet (25 mg total) by mouth once daily. 30 tablet 5    ketoconazole (NIZORAL) 2 % shampoo SMARTSI Application Topical Every Other Day PRN      lancets (TRUEPLUS LANCETS) 33 gauge Misc 1 lancet  by Misc.(Non-Drug; Combo Route) route once daily. 100 each 3    lisinopriL (PRINIVIL,ZESTRIL) 40 MG tablet Take 1 tablet (40 mg total) by mouth once daily. 30 tablet 5    metFORMIN (GLUCOPHAGE) 500 MG tablet Take 1 tablet (500 mg total) by mouth 2 (two) times daily with meals. 60 tablet 5    semaglutide (OZEMPIC) 0.25 mg or 0.5 mg (2 mg/3 mL) pen injector Inject 0.5 mg into the skin every 7  "days. 1 each 1    triamcinolone acetonide 0.1% (KENALOG) 0.1 % cream Apply topically 2 (two) times daily as needed.         Allergies  Review of patient's allergies indicates:   Allergen Reactions    Sulfamethoxazole-trimethoprim Rash       Physical Examination     Vitals:    06/10/25 1551 06/10/25 1553   BP: (!) 160/96 (!) 156/79   BP Location: Left forearm    Patient Position: Sitting    Pulse: 79    Resp: 18    Temp: 98.5 °F (36.9 °C)    TempSrc: Oral    SpO2: 97%    Weight: (!) 160.1 kg (353 lb)    Height: 5' 5.5" (1.664 m)      Physical Exam  Vitals and nursing note reviewed.   Constitutional:       General: She is not in acute distress.     Appearance: She is obese.   HENT:      Nose: Nose normal.      Mouth/Throat:      Mouth: Mucous membranes are moist.   Eyes:      Pupils: Pupils are equal, round, and reactive to light.   Cardiovascular:      Rate and Rhythm: Normal rate and regular rhythm.      Pulses: Normal pulses.      Heart sounds: Normal heart sounds. No murmur heard.  Pulmonary:      Effort: Pulmonary effort is normal. No respiratory distress.      Breath sounds: Normal breath sounds. No wheezing, rhonchi or rales.   Chest:      Chest wall: No tenderness.   Abdominal:      General: Bowel sounds are normal.      Palpations: Abdomen is soft.   Musculoskeletal:         General: Normal range of motion.      Cervical back: Normal range of motion and neck supple.      Right lower leg: No edema.      Left lower leg: No edema.   Skin:     General: Skin is warm and dry.   Neurological:      General: No focal deficit present.      Mental Status: She is alert and oriented to person, place, and time.          Assessment and Plan (including Health Maintenance)      Problem List  Smart Sets  Document Outside HM   :    Plan:         Health Maintenance Due   Topic Date Due    COVID-19 Vaccine (3 - 2024-25 season) 09/01/2024    Hemoglobin A1c  04/23/2025    Diabetic Eye Exam  06/07/2025       Problem List Items " Addressed This Visit          Cardiac/Vascular    Essential hypertension - Primary       Endocrine    BMI 50.0-59.9, adult    Type 2 diabetes mellitus with hyperglycemia, without long-term current use of insulin     Other Visit Diagnoses         Medically noncompliant              Essential hypertension    BMI 50.0-59.9, adult    Type 2 diabetes mellitus with hyperglycemia, without long-term current use of insulin    Medically noncompliant       Health Maintenance Topics with due status: Not Due       Topic Last Completion Date    Cervical Cancer Screening 06/06/2024    Diabetes Urine Screening 01/23/2025    Lipid Panel 01/23/2025    TETANUS VACCINE 02/25/2025    Foot Exam 04/03/2025    RSV Vaccine (Age 60+ and Pregnant patients) Not Due           Future Appointments   Date Time Provider Department Center   6/27/2025  1:40 PM Jocelynn Carolina MD [facility] MARLEN [facility]   7/28/2025 11:00 AM Bettye Griffiths CNM [facility] LEAH Rush [facility]        No follow-ups on file.     Signature:  Cristina Brooks DNP, FNP  [address] [place] [address]  Phone #: [phone]  Fax #: [phone]    Date of encounter: 6/10/25    Patient Instructions   Keep follow up appointments with primary care provider. Monitor blood pressure for 2 weeks. Make sure that you have empty bladder, resting for 5 minutes, and feet flat on the floor.